# Patient Record
Sex: MALE | Race: WHITE | NOT HISPANIC OR LATINO | URBAN - METROPOLITAN AREA
[De-identification: names, ages, dates, MRNs, and addresses within clinical notes are randomized per-mention and may not be internally consistent; named-entity substitution may affect disease eponyms.]

---

## 2018-03-19 ENCOUNTER — OUTPATIENT (OUTPATIENT)
Dept: OUTPATIENT SERVICES | Facility: HOSPITAL | Age: 33
LOS: 1 days | Discharge: HOME | End: 2018-03-19

## 2018-03-19 DIAGNOSIS — I26.99 OTHER PULMONARY EMBOLISM WITHOUT ACUTE COR PULMONALE: ICD-10-CM

## 2018-03-19 DIAGNOSIS — D68.9 COAGULATION DEFECT, UNSPECIFIED: ICD-10-CM

## 2018-03-22 ENCOUNTER — LABORATORY RESULT (OUTPATIENT)
Age: 33
End: 2018-03-22

## 2018-03-22 ENCOUNTER — APPOINTMENT (OUTPATIENT)
Dept: HEMATOLOGY ONCOLOGY | Facility: CLINIC | Age: 33
End: 2018-03-22
Payer: MEDICAID

## 2018-03-22 ENCOUNTER — RESULT REVIEW (OUTPATIENT)
Age: 33
End: 2018-03-22

## 2018-03-22 ENCOUNTER — OUTPATIENT (OUTPATIENT)
Dept: OUTPATIENT SERVICES | Facility: HOSPITAL | Age: 33
LOS: 1 days | End: 2018-03-22
Payer: MEDICAID

## 2018-03-22 VITALS
TEMPERATURE: 98 F | OXYGEN SATURATION: 94 % | BODY MASS INDEX: 27.55 KG/M2 | HEIGHT: 71 IN | SYSTOLIC BLOOD PRESSURE: 130 MMHG | DIASTOLIC BLOOD PRESSURE: 79 MMHG | WEIGHT: 196.76 LBS | HEART RATE: 77 BPM

## 2018-03-22 DIAGNOSIS — F17.200 NICOTINE DEPENDENCE, UNSPECIFIED, UNCOMPLICATED: ICD-10-CM

## 2018-03-22 DIAGNOSIS — Z80.7 FAMILY HISTORY OF OTHER MALIGNANT NEOPLASMS OF LYMPHOID, HEMATOPOIETIC AND RELATED TISSUES: ICD-10-CM

## 2018-03-22 DIAGNOSIS — D68.9 COAGULATION DEFECT, UNSPECIFIED: ICD-10-CM

## 2018-03-22 DIAGNOSIS — Z78.9 OTHER SPECIFIED HEALTH STATUS: ICD-10-CM

## 2018-03-22 LAB
BASOPHILS # BLD AUTO: 0.2 K/UL — SIGNIFICANT CHANGE UP (ref 0–0.2)
BASOPHILS NFR BLD AUTO: 1.6 % — SIGNIFICANT CHANGE UP (ref 0–2)
EOSINOPHIL # BLD AUTO: 0.5 K/UL — SIGNIFICANT CHANGE UP (ref 0–0.5)
EOSINOPHIL NFR BLD AUTO: 5.6 % — SIGNIFICANT CHANGE UP (ref 0–6)
HCT VFR BLD CALC: 47.7 % — SIGNIFICANT CHANGE UP (ref 39–50)
HGB BLD-MCNC: 16.7 G/DL — SIGNIFICANT CHANGE UP (ref 13–17)
LYMPHOCYTES # BLD AUTO: 2.6 K/UL — SIGNIFICANT CHANGE UP (ref 1–3.3)
LYMPHOCYTES # BLD AUTO: 26.6 % — SIGNIFICANT CHANGE UP (ref 13–44)
MCHC RBC-ENTMCNC: 29.3 PG — SIGNIFICANT CHANGE UP (ref 27–34)
MCHC RBC-ENTMCNC: 34.9 GM/DL — SIGNIFICANT CHANGE UP (ref 32–36)
MCV RBC AUTO: 83.8 FL — SIGNIFICANT CHANGE UP (ref 80–100)
MONOCYTES # BLD AUTO: 0.6 K/UL — SIGNIFICANT CHANGE UP (ref 0–0.9)
MONOCYTES NFR BLD AUTO: 5.7 % — SIGNIFICANT CHANGE UP (ref 2–14)
NEUTROPHILS # BLD AUTO: 5.8 K/UL — SIGNIFICANT CHANGE UP (ref 1.8–7.4)
NEUTROPHILS NFR BLD AUTO: 60.4 % — SIGNIFICANT CHANGE UP (ref 43–77)
PLATELET # BLD AUTO: 254 K/UL — SIGNIFICANT CHANGE UP (ref 150–400)
RBC # BLD: 5.69 M/UL — SIGNIFICANT CHANGE UP (ref 4.2–5.8)
RBC # FLD: 11.7 % — SIGNIFICANT CHANGE UP (ref 10.3–14.5)
WBC # BLD: 9.7 K/UL — SIGNIFICANT CHANGE UP (ref 3.8–10.5)
WBC # FLD AUTO: 9.7 K/UL — SIGNIFICANT CHANGE UP (ref 3.8–10.5)

## 2018-03-22 PROCEDURE — 81241 F5 GENE: CPT

## 2018-03-22 PROCEDURE — 99205 OFFICE O/P NEW HI 60 MIN: CPT

## 2018-03-22 PROCEDURE — G0452: CPT | Mod: 26

## 2018-03-22 PROCEDURE — 81240 F2 GENE: CPT

## 2018-03-23 LAB
ALBUMIN SERPL ELPH-MCNC: 4.9 G/DL
ALP BLD-CCNC: 104 U/L
ALT SERPL-CCNC: 25 U/L
ANION GAP SERPL CALC-SCNC: 14 MMOL/L
AST SERPL-CCNC: 30 U/L
BILIRUB SERPL-MCNC: 0.3 MG/DL
BUN SERPL-MCNC: 17 MG/DL
CALCIUM SERPL-MCNC: 9.8 MG/DL
CHLORIDE SERPL-SCNC: 102 MMOL/L
CO2 SERPL-SCNC: 26 MMOL/L
CREAT SERPL-MCNC: 0.86 MG/DL
FERRITIN SERPL-MCNC: 208 NG/ML
IRON SATN MFR SERPL: 35 %
IRON SERPL-MCNC: 110 UG/DL
POTASSIUM SERPL-SCNC: 5 MMOL/L
PROT SERPL-MCNC: 8 G/DL
SODIUM SERPL-SCNC: 142 MMOL/L
TIBC SERPL-MCNC: 314 UG/DL
UIBC SERPL-MCNC: 204 UG/DL

## 2018-03-26 DIAGNOSIS — E83.110 HEREDITARY HEMOCHROMATOSIS: ICD-10-CM

## 2018-03-27 LAB
B2 GLYCOPROT1 AB SER QL: POSITIVE
CARDIOLIPIN AB SER IA-ACNC: NEGATIVE

## 2018-04-02 DIAGNOSIS — R59.0 LOCALIZED ENLARGED LYMPH NODES: ICD-10-CM

## 2018-04-02 LAB — TM INTERPRETATION: NORMAL

## 2018-07-09 ENCOUNTER — OUTPATIENT (OUTPATIENT)
Dept: OUTPATIENT SERVICES | Facility: HOSPITAL | Age: 33
LOS: 1 days | Discharge: HOME | End: 2018-07-09

## 2018-07-09 DIAGNOSIS — I26.99 OTHER PULMONARY EMBOLISM WITHOUT ACUTE COR PULMONALE: ICD-10-CM

## 2018-07-13 ENCOUNTER — APPOINTMENT (OUTPATIENT)
Dept: HEMATOLOGY ONCOLOGY | Facility: CLINIC | Age: 33
End: 2018-07-13

## 2018-08-03 ENCOUNTER — RX RENEWAL (OUTPATIENT)
Age: 33
End: 2018-08-03

## 2018-12-16 ENCOUNTER — OUTPATIENT (OUTPATIENT)
Dept: OUTPATIENT SERVICES | Facility: HOSPITAL | Age: 33
LOS: 1 days | Discharge: HOME | End: 2018-12-16

## 2018-12-16 DIAGNOSIS — I26.99 OTHER PULMONARY EMBOLISM WITHOUT ACUTE COR PULMONALE: ICD-10-CM

## 2018-12-16 DIAGNOSIS — E83.110 HEREDITARY HEMOCHROMATOSIS: ICD-10-CM

## 2018-12-28 ENCOUNTER — APPOINTMENT (OUTPATIENT)
Dept: HEMATOLOGY ONCOLOGY | Facility: CLINIC | Age: 33
End: 2018-12-28
Payer: COMMERCIAL

## 2018-12-28 VITALS
SYSTOLIC BLOOD PRESSURE: 164 MMHG | TEMPERATURE: 98.2 F | OXYGEN SATURATION: 97 % | WEIGHT: 188 LBS | HEIGHT: 71 IN | DIASTOLIC BLOOD PRESSURE: 76 MMHG | BODY MASS INDEX: 26.32 KG/M2 | HEART RATE: 79 BPM

## 2018-12-28 DIAGNOSIS — E83.110 HEREDITARY HEMOCHROMATOSIS: ICD-10-CM

## 2018-12-28 DIAGNOSIS — I26.99 OTHER PULMONARY EMBOLISM W/OUT ACUTE COR PULMONALE: ICD-10-CM

## 2018-12-28 PROCEDURE — 99214 OFFICE O/P EST MOD 30 MIN: CPT

## 2018-12-28 RX ORDER — RIVAROXABAN 20 MG/1
20 TABLET, FILM COATED ORAL
Qty: 30 | Refills: 3 | Status: DISCONTINUED | COMMUNITY
Start: 2018-03-22 | End: 2018-12-28

## 2018-12-28 NOTE — REASON FOR VISIT
[Parent] : parent [Follow-Up Visit] : a follow-up visit for [FreeTextEntry2] : pulmonary emboli, hemochromatosis

## 2018-12-28 NOTE — ASSESSMENT
[FreeTextEntry1] : 32 year old gentleman, current smoker, diagnosed with pulmonary emboli on 3/1/18.  This appears to be an unprovoked event. CT angio showed multiple pulmonary emboli on the right side and possible lingula infiltrate vs infarction.     He tested negative for lupus anticoagulant, protein S, protein C, antithrombin III.  He needs FVL and PT gene mutation, as well as B2 glycoprotein and anticardiolipin antibodies.  He completed ~9 months of anticoagulation in 12/2018. \par \par Given reported history of hemochromatosis, HFE testing was done which showed H63D homozygous mutation which is less likely to be associated with iron overload as compared to C282Y mutation (for which he was normal).  Serum ferriting was normal at 208 ng/ml, %sat 35.  Work up not suggestive of iron overload, no role for MRI Liver  at this time. \par \par Follow up PRN, follow up with PCP for routine physicals.

## 2018-12-28 NOTE — HISTORY OF PRESENT ILLNESS
[de-identified] : Mr. Maxwell is a 32 year old gentleman who presents for evaluation of pulmonary emboli and hemochromatosis. \par \par He was admitted to [facility] on 31/18 with left sided chest pain and hemoptysis.   He was noted to have bilateral pulmonary emboli and pneumonia. He was started on Heparin infusion and also received antibiotics.  He was transitioned to apixaban but broke out in rash on the 3rd day and was switched to Xarelto.  He's on Xarelto 20 mg daily.  \par \par He denies any recent surgery, travel, no immobility.  He was smoking 1/2 PPD x since age 18.  He is now trying to quit, currently doing 3 cigarettes per day.  He reports right leg pain x 6 months on and off. No fevers, night sweats. He has lost 7 lbs since hospitalization.  No history of stroke. His grandmother was diagnosed with a blood clot at age 95. \par No fevers.  No SOB, chest pain, BERNARD.  No dizziness or headaches. No hematuria. No hematochezia.  No melena. \par \par 3/2/18 hypercoag work up\par Lupus anticoagulant - negative\par Antithrombin III - 111%\par Protein C activity - 81%\par Protein S activity - 76%\par \par 3/1/18 CT chest - multiple pulmonary emboli of right lung.  Lingula consolidation may represent atelectasis vs pneumonia or evolving pulmonary infarction.  Multiple periaortic lymph nodes are noted, largest of which measures appximately 1.2 x 0.9 cm and 1 x 1 cm, both of which lack typical fatty hilum.   6 mm sclerotic focus, likely bone island at anterior end plate of T12 vertebral body measuring 6 x 5 mm. \par \par 3/3/18 CT abd/pelvis : mild hepatomegaly, no focal lesion. No enlarged retroperitoneal pelvic adenopathy.  Copious stool in colon.  \par \par He was diagnosed with hemochromatosis at age 18.  He had HFE gene testing done at that time but doesn't have the results.  He also had a liver biopsy which reportedly showed iron deposition. He has never had a phlebotomy and has not seen a hematologist before. \par  [de-identified] : Mr. Maxwell returns today for his follow-up. He stopped Xarelto 3 weeks ago.\par He admits that he is feeling good and has no problems.\par He denies fevers, night sweats, weight loss, recent infections, and taking any medications.\par He notes that he is trying to stop smoking.\par The remainder of his ROS is negative.

## 2018-12-28 NOTE — ADDENDUM
[FreeTextEntry1] : I, Sathya Pineda, acted solely as a scribe for Dr. Saleem Mercer on this date 12/28/18.

## 2020-08-09 ENCOUNTER — INPATIENT (INPATIENT)
Facility: HOSPITAL | Age: 35
LOS: 1 days | Discharge: HOME | DRG: 897 | End: 2020-08-11
Attending: HOSPITALIST | Admitting: HOSPITALIST
Payer: COMMERCIAL

## 2020-08-09 VITALS
WEIGHT: 188.05 LBS | SYSTOLIC BLOOD PRESSURE: 127 MMHG | OXYGEN SATURATION: 99 % | DIASTOLIC BLOOD PRESSURE: 74 MMHG | HEART RATE: 71 BPM | HEIGHT: 71 IN | TEMPERATURE: 98 F | RESPIRATION RATE: 20 BRPM

## 2020-08-09 DIAGNOSIS — F11.23 OPIOID DEPENDENCE WITH WITHDRAWAL: ICD-10-CM

## 2020-08-09 LAB
BASOPHILS # BLD AUTO: 0.02 K/UL — SIGNIFICANT CHANGE UP (ref 0–0.2)
BASOPHILS NFR BLD AUTO: 0.2 % — SIGNIFICANT CHANGE UP (ref 0–2)
EOSINOPHIL # BLD AUTO: 0.02 K/UL — SIGNIFICANT CHANGE UP (ref 0–0.5)
EOSINOPHIL NFR BLD AUTO: 0.2 % — SIGNIFICANT CHANGE UP (ref 0–6)
HCT VFR BLD CALC: 41.2 % — SIGNIFICANT CHANGE UP (ref 39–50)
HGB BLD-MCNC: 14.4 G/DL — SIGNIFICANT CHANGE UP (ref 13–17)
IMM GRANULOCYTES NFR BLD AUTO: 0.3 % — SIGNIFICANT CHANGE UP (ref 0–1.5)
LYMPHOCYTES # BLD AUTO: 1.45 K/UL — SIGNIFICANT CHANGE UP (ref 1–3.3)
LYMPHOCYTES # BLD AUTO: 14.9 % — SIGNIFICANT CHANGE UP (ref 13–44)
MCHC RBC-ENTMCNC: 28.9 PG — SIGNIFICANT CHANGE UP (ref 27–34)
MCHC RBC-ENTMCNC: 35 GM/DL — SIGNIFICANT CHANGE UP (ref 32–36)
MCV RBC AUTO: 82.6 FL — SIGNIFICANT CHANGE UP (ref 80–100)
MONOCYTES # BLD AUTO: 0.35 K/UL — SIGNIFICANT CHANGE UP (ref 0–0.9)
MONOCYTES NFR BLD AUTO: 3.6 % — SIGNIFICANT CHANGE UP (ref 2–14)
NEUTROPHILS # BLD AUTO: 7.89 K/UL — HIGH (ref 1.8–7.4)
NEUTROPHILS NFR BLD AUTO: 80.8 % — HIGH (ref 43–77)
NRBC # BLD: 0 /100 WBCS — SIGNIFICANT CHANGE UP (ref 0–0)
PLATELET # BLD AUTO: 205 K/UL — SIGNIFICANT CHANGE UP (ref 150–400)
RBC # BLD: 4.99 M/UL — SIGNIFICANT CHANGE UP (ref 4.2–5.8)
RBC # FLD: 12.7 % — SIGNIFICANT CHANGE UP (ref 10.3–14.5)
WBC # BLD: 9.76 K/UL — SIGNIFICANT CHANGE UP (ref 3.8–10.5)
WBC # FLD AUTO: 9.76 K/UL — SIGNIFICANT CHANGE UP (ref 3.8–10.5)

## 2020-08-09 PROCEDURE — 99285 EMERGENCY DEPT VISIT HI MDM: CPT

## 2020-08-09 PROCEDURE — 93010 ELECTROCARDIOGRAM REPORT: CPT

## 2020-08-09 RX ORDER — BUPRENORPHINE AND NALOXONE 2; .5 MG/1; MG/1
1 TABLET SUBLINGUAL ONCE
Refills: 0 | Status: DISCONTINUED | OUTPATIENT
Start: 2020-08-09 | End: 2020-08-10

## 2020-08-09 RX ORDER — ONDANSETRON 8 MG/1
4 TABLET, FILM COATED ORAL ONCE
Refills: 0 | Status: COMPLETED | OUTPATIENT
Start: 2020-08-09 | End: 2020-08-09

## 2020-08-09 RX ORDER — SODIUM CHLORIDE 9 MG/ML
1000 INJECTION INTRAMUSCULAR; INTRAVENOUS; SUBCUTANEOUS ONCE
Refills: 0 | Status: COMPLETED | OUTPATIENT
Start: 2020-08-09 | End: 2020-08-09

## 2020-08-09 RX ADMIN — ONDANSETRON 4 MILLIGRAM(S): 8 TABLET, FILM COATED ORAL at 22:29

## 2020-08-09 RX ADMIN — Medication 0.1 MILLIGRAM(S): at 22:29

## 2020-08-09 RX ADMIN — SODIUM CHLORIDE 1000 MILLILITER(S): 9 INJECTION INTRAMUSCULAR; INTRAVENOUS; SUBCUTANEOUS at 22:30

## 2020-08-09 NOTE — ED ADULT TRIAGE NOTE - CHIEF COMPLAINT QUOTE
hx heroin abuse; relapsed; took heroin 2 days ago; come in with c/o withdrawal; c/o overall body aches; vomiting bile; feels in a fog; denies SI/HI or hallucinations

## 2020-08-09 NOTE — ED ADULT NURSE NOTE - OBJECTIVE STATEMENT
Pt is a 36 y/o male c/o hx heroin abuse, last used 2 days ago and would like to "detox"/ get rehab. States he wants to stop, still has some at home and does not feel safe going home, lives alone, denies any other drug or alcohol use. Has had symptoms x1.5 days of general body aches, headache, photosensitivity, nausea. Denies CP, SOB, V/D, numbness, tingling, cough, fever, chills, dizziness, weakness, headache. A&Ox3. Breathing unlabored and spontaneous. Abdomen soft, nontender, nondistended. Full ROM and strength of extremities. Safety and comfort measures provided.

## 2020-08-09 NOTE — ED PROVIDER NOTE - CLINICAL SUMMARY MEDICAL DECISION MAKING FREE TEXT BOX
36y/o M w/ h/o PE (no AC), opioid abuse p/w opioid withdrawal symptoms. Slightly hypertensive. Well appearing. Will give IVF, zofran, clonidine. Social work for opioid abuse. 36y/o M w/ h/o PE (no AC), opioid abuse p/w opioid withdrawal symptoms. Slightly hypertensive. Well appearing. COWS score 18. Will give IVF, zofran, clonidine. Social work for opioid abuse.

## 2020-08-09 NOTE — ED PROVIDER NOTE - OBJECTIVE STATEMENT
34y/o M w/ h/o PE (no AC), opioid abuse p/w opioid withdrawal. Pt states that he used to 36y/o M w/ h/o PE (no AC), opioid abuse p/w opioid withdrawal. Pt states that he used to take oxy for back pain and snorted heroine 3 wks ago, then again 2 d ago. Now c/o sweating, 3 episdoes of bilious emesis, lower extremity bone pain, runny nose, excessive yawning. Interested in detox. Denies fevers, chest pain, cough, sob, abdominal pain, back pain, dysuria, numbness, tingling, alcohol abuse, IVDA, other recreational drugs, SI/HI. 34y/o M w/ h/o PE (no AC), opioid abuse p/w opioid withdrawal. Pt states that he used to take oxy for back pain and snorted heroine 3 wks ago, then again 2 d ago. Now c/o sweating, 3 episodes of bilious emesis, lower extremity bone pain, runny nose, excessive yawning. Interested in detox. Denies fevers, chest pain, cough, sob, abdominal pain, back pain, dysuria, numbness, tingling, alcohol abuse, IVDA, other recreational drugs, SI/HI.

## 2020-08-09 NOTE — ED PROVIDER NOTE - ATTENDING CONTRIBUTION TO CARE
34 yo male hx of opiate abuse including snorting heroin x 2 p/w myalgias, fogginess, nausea, irritability, concerning for withdrawal.  nontoxic otherwise on exam.  will give IVF, medicate, check labs and reassess.

## 2020-08-09 NOTE — ED PROVIDER NOTE - PHYSICAL EXAMINATION
GENERAL: restless non-toxic appearing in NAD  HEAD: normocephalic, atraumatic  HEENT: normal conjunctiva, oral mucosa moist, uvula midline, no tonsilar exudates, neck supple, no JVD  CARDIAC: regular rate and rhythm, normal S1S2, no appreciable murmurs, no peripheral edema, 2+ pulses in UE/LE b/l  PULM: normal breath sounds, CTA b/l, no rales, rhonchi, wheezing  GI: abdomen nondistended, soft, nontender, no guarding, no rebound tenderness  : no CVA tenderness b/l, no suprapubic tenderness  NEURO: pupils 5 mm, PERRL, EOMI, CN2-12 intact, 5/5 motor strength BUE and BLE, sensation intact to light touch BUE and BLE: C5-T1 and L3-S1   MSK: no calf tenderness b/l  SKIN: well-perfused, extremities warm, no visible rashes  PSYCH: appropriate mood and affect GENERAL: restless non-toxic appearing in NAD  HEAD: normocephalic, atraumatic  HEENT: normal conjunctiva, oral mucosa moist  CARDIAC: regular rate and rhythm, normal S1S2, no appreciable murmurs  PULM: normal breath sounds, CTA b/l, no rales, rhonchi, wheezing  GI: abdomen nondistended, soft, nontender, no guarding, no rebound tenderness  : no CVA tenderness b/l, no suprapubic tenderness  NEURO: pupils 5 mm, PERRL, EOMI, CN2-12 intact, 5/5 motor strength BUE and BLE, sensation intact to light touch BUE and BLE  MSK: no calf tenderness b/l  SKIN: well-perfused, extremities warm, no visible rashes  PSYCH: appropriate mood and affect

## 2020-08-09 NOTE — ED PROVIDER NOTE - NS ED ROS FT
GENERAL: +chills, sweating; denies fever  HEENT: +runny nose; denies headaches, dizziness, ear pain, vision changes  CARDIAC: denies chest pain, palpitations  PULM: denies SOB, cough  GI: +nausea, vomiting, diarrhea; denies abdominal pain, constipation, hematochezia  : denies dysuria, frequency, incontinence, hematuria  NEURO: denies motor weakness, sensory changes  MSK: +joint pain  SKIN: denies new rashes, hives  HEME: denies active bleeding, bruising

## 2020-08-09 NOTE — ED PROVIDER NOTE - PROGRESS NOTE DETAILS
Jose R, PGY2: pt states he has heroine at home and severe withdrawal. pt lives by himself, not safe discharge home. pt's parents given narcan kit to go home

## 2020-08-10 DIAGNOSIS — I26.99 OTHER PULMONARY EMBOLISM WITHOUT ACUTE COR PULMONALE: ICD-10-CM

## 2020-08-10 DIAGNOSIS — Z02.9 ENCOUNTER FOR ADMINISTRATIVE EXAMINATIONS, UNSPECIFIED: ICD-10-CM

## 2020-08-10 DIAGNOSIS — F11.23 OPIOID DEPENDENCE WITH WITHDRAWAL: ICD-10-CM

## 2020-08-10 DIAGNOSIS — F11.20 OPIOID DEPENDENCE, UNCOMPLICATED: ICD-10-CM

## 2020-08-10 DIAGNOSIS — Z29.9 ENCOUNTER FOR PROPHYLACTIC MEASURES, UNSPECIFIED: ICD-10-CM

## 2020-08-10 DIAGNOSIS — F17.200 NICOTINE DEPENDENCE, UNSPECIFIED, UNCOMPLICATED: ICD-10-CM

## 2020-08-10 LAB
ALBUMIN SERPL ELPH-MCNC: 4.5 G/DL — SIGNIFICANT CHANGE UP (ref 3.3–5)
ALBUMIN SERPL ELPH-MCNC: 4.7 G/DL — SIGNIFICANT CHANGE UP (ref 3.3–5)
ALP SERPL-CCNC: 66 U/L — SIGNIFICANT CHANGE UP (ref 40–120)
ALP SERPL-CCNC: 66 U/L — SIGNIFICANT CHANGE UP (ref 40–120)
ALT FLD-CCNC: 16 U/L — SIGNIFICANT CHANGE UP (ref 10–45)
ALT FLD-CCNC: 16 U/L — SIGNIFICANT CHANGE UP (ref 10–45)
ANION GAP SERPL CALC-SCNC: 12 MMOL/L — SIGNIFICANT CHANGE UP (ref 5–17)
ANION GAP SERPL CALC-SCNC: 15 MMOL/L — SIGNIFICANT CHANGE UP (ref 5–17)
AST SERPL-CCNC: 20 U/L — SIGNIFICANT CHANGE UP (ref 10–40)
AST SERPL-CCNC: 22 U/L — SIGNIFICANT CHANGE UP (ref 10–40)
BASOPHILS # BLD AUTO: 0.02 K/UL — SIGNIFICANT CHANGE UP (ref 0–0.2)
BASOPHILS NFR BLD AUTO: 0.2 % — SIGNIFICANT CHANGE UP (ref 0–2)
BILIRUB SERPL-MCNC: 0.6 MG/DL — SIGNIFICANT CHANGE UP (ref 0.2–1.2)
BILIRUB SERPL-MCNC: 0.7 MG/DL — SIGNIFICANT CHANGE UP (ref 0.2–1.2)
BUN SERPL-MCNC: 10 MG/DL — SIGNIFICANT CHANGE UP (ref 7–23)
BUN SERPL-MCNC: 11 MG/DL — SIGNIFICANT CHANGE UP (ref 7–23)
CALCIUM SERPL-MCNC: 9.1 MG/DL — SIGNIFICANT CHANGE UP (ref 8.4–10.5)
CALCIUM SERPL-MCNC: 9.7 MG/DL — SIGNIFICANT CHANGE UP (ref 8.4–10.5)
CHLORIDE SERPL-SCNC: 103 MMOL/L — SIGNIFICANT CHANGE UP (ref 96–108)
CHLORIDE SERPL-SCNC: 105 MMOL/L — SIGNIFICANT CHANGE UP (ref 96–108)
CO2 SERPL-SCNC: 23 MMOL/L — SIGNIFICANT CHANGE UP (ref 22–31)
CO2 SERPL-SCNC: 27 MMOL/L — SIGNIFICANT CHANGE UP (ref 22–31)
CREAT SERPL-MCNC: 0.81 MG/DL — SIGNIFICANT CHANGE UP (ref 0.5–1.3)
CREAT SERPL-MCNC: 0.86 MG/DL — SIGNIFICANT CHANGE UP (ref 0.5–1.3)
EOSINOPHIL # BLD AUTO: 0.01 K/UL — SIGNIFICANT CHANGE UP (ref 0–0.5)
EOSINOPHIL NFR BLD AUTO: 0.1 % — SIGNIFICANT CHANGE UP (ref 0–6)
GLUCOSE SERPL-MCNC: 106 MG/DL — HIGH (ref 70–99)
GLUCOSE SERPL-MCNC: 112 MG/DL — HIGH (ref 70–99)
HCT VFR BLD CALC: 41.7 % — SIGNIFICANT CHANGE UP (ref 39–50)
HGB BLD-MCNC: 14.6 G/DL — SIGNIFICANT CHANGE UP (ref 13–17)
IMM GRANULOCYTES NFR BLD AUTO: 0.4 % — SIGNIFICANT CHANGE UP (ref 0–1.5)
LYMPHOCYTES # BLD AUTO: 1.19 K/UL — SIGNIFICANT CHANGE UP (ref 1–3.3)
LYMPHOCYTES # BLD AUTO: 12 % — LOW (ref 13–44)
MCHC RBC-ENTMCNC: 28.8 PG — SIGNIFICANT CHANGE UP (ref 27–34)
MCHC RBC-ENTMCNC: 35 GM/DL — SIGNIFICANT CHANGE UP (ref 32–36)
MCV RBC AUTO: 82.2 FL — SIGNIFICANT CHANGE UP (ref 80–100)
MONOCYTES # BLD AUTO: 0.35 K/UL — SIGNIFICANT CHANGE UP (ref 0–0.9)
MONOCYTES NFR BLD AUTO: 3.5 % — SIGNIFICANT CHANGE UP (ref 2–14)
NEUTROPHILS # BLD AUTO: 8.31 K/UL — HIGH (ref 1.8–7.4)
NEUTROPHILS NFR BLD AUTO: 83.8 % — HIGH (ref 43–77)
NRBC # BLD: 0 /100 WBCS — SIGNIFICANT CHANGE UP (ref 0–0)
PLATELET # BLD AUTO: 221 K/UL — SIGNIFICANT CHANGE UP (ref 150–400)
POTASSIUM SERPL-MCNC: 3.6 MMOL/L — SIGNIFICANT CHANGE UP (ref 3.5–5.3)
POTASSIUM SERPL-MCNC: 3.9 MMOL/L — SIGNIFICANT CHANGE UP (ref 3.5–5.3)
POTASSIUM SERPL-SCNC: 3.6 MMOL/L — SIGNIFICANT CHANGE UP (ref 3.5–5.3)
POTASSIUM SERPL-SCNC: 3.9 MMOL/L — SIGNIFICANT CHANGE UP (ref 3.5–5.3)
PROT SERPL-MCNC: 6.8 G/DL — SIGNIFICANT CHANGE UP (ref 6–8.3)
PROT SERPL-MCNC: 7 G/DL — SIGNIFICANT CHANGE UP (ref 6–8.3)
RBC # BLD: 5.07 M/UL — SIGNIFICANT CHANGE UP (ref 4.2–5.8)
RBC # FLD: 12.4 % — SIGNIFICANT CHANGE UP (ref 10.3–14.5)
SARS-COV-2 IGG SERPL QL IA: NEGATIVE — SIGNIFICANT CHANGE UP
SARS-COV-2 IGM SERPL IA-ACNC: 0.08 INDEX — SIGNIFICANT CHANGE UP
SARS-COV-2 RNA SPEC QL NAA+PROBE: SIGNIFICANT CHANGE UP
SODIUM SERPL-SCNC: 141 MMOL/L — SIGNIFICANT CHANGE UP (ref 135–145)
SODIUM SERPL-SCNC: 144 MMOL/L — SIGNIFICANT CHANGE UP (ref 135–145)
WBC # BLD: 9.92 K/UL — SIGNIFICANT CHANGE UP (ref 3.8–10.5)
WBC # FLD AUTO: 9.92 K/UL — SIGNIFICANT CHANGE UP (ref 3.8–10.5)

## 2020-08-10 PROCEDURE — 99233 SBSQ HOSP IP/OBS HIGH 50: CPT | Mod: GC

## 2020-08-10 PROCEDURE — 99406 BEHAV CHNG SMOKING 3-10 MIN: CPT

## 2020-08-10 PROCEDURE — 99223 1ST HOSP IP/OBS HIGH 75: CPT | Mod: 25

## 2020-08-10 PROCEDURE — 93010 ELECTROCARDIOGRAM REPORT: CPT

## 2020-08-10 PROCEDURE — 90792 PSYCH DIAG EVAL W/MED SRVCS: CPT

## 2020-08-10 RX ORDER — BUPRENORPHINE AND NALOXONE 2; .5 MG/1; MG/1
1 TABLET SUBLINGUAL ONCE
Refills: 0 | Status: DISCONTINUED | OUTPATIENT
Start: 2020-08-10 | End: 2020-08-10

## 2020-08-10 RX ORDER — CYCLOBENZAPRINE HYDROCHLORIDE 10 MG/1
10 TABLET, FILM COATED ORAL THREE TIMES A DAY
Refills: 0 | Status: DISCONTINUED | OUTPATIENT
Start: 2020-08-10 | End: 2020-08-11

## 2020-08-10 RX ORDER — NICOTINE POLACRILEX 2 MG
1 GUM BUCCAL DAILY
Refills: 0 | Status: DISCONTINUED | OUTPATIENT
Start: 2020-08-10 | End: 2020-08-11

## 2020-08-10 RX ORDER — BUPRENORPHINE AND NALOXONE 2; .5 MG/1; MG/1
1 TABLET SUBLINGUAL
Refills: 0 | Status: DISCONTINUED | OUTPATIENT
Start: 2020-08-10 | End: 2020-08-10

## 2020-08-10 RX ORDER — SODIUM CHLORIDE 9 MG/ML
1000 INJECTION INTRAMUSCULAR; INTRAVENOUS; SUBCUTANEOUS
Refills: 0 | Status: DISCONTINUED | OUTPATIENT
Start: 2020-08-10 | End: 2020-08-11

## 2020-08-10 RX ORDER — IBUPROFEN 200 MG
600 TABLET ORAL EVERY 6 HOURS
Refills: 0 | Status: DISCONTINUED | OUTPATIENT
Start: 2020-08-10 | End: 2020-08-11

## 2020-08-10 RX ORDER — BUPRENORPHINE AND NALOXONE 2; .5 MG/1; MG/1
1 TABLET SUBLINGUAL
Refills: 0 | Status: COMPLETED | OUTPATIENT
Start: 2020-08-11 | End: 2020-08-11

## 2020-08-10 RX ORDER — LOPERAMIDE HCL 2 MG
2 TABLET ORAL EVERY 12 HOURS
Refills: 0 | Status: DISCONTINUED | OUTPATIENT
Start: 2020-08-10 | End: 2020-08-11

## 2020-08-10 RX ORDER — ONDANSETRON 8 MG/1
4 TABLET, FILM COATED ORAL EVERY 8 HOURS
Refills: 0 | Status: DISCONTINUED | OUTPATIENT
Start: 2020-08-10 | End: 2020-08-11

## 2020-08-10 RX ORDER — LOPERAMIDE HCL 2 MG
2 TABLET ORAL
Refills: 0 | Status: DISCONTINUED | OUTPATIENT
Start: 2020-08-10 | End: 2020-08-10

## 2020-08-10 RX ORDER — ACETAMINOPHEN 500 MG
650 TABLET ORAL EVERY 4 HOURS
Refills: 0 | Status: DISCONTINUED | OUTPATIENT
Start: 2020-08-10 | End: 2020-08-11

## 2020-08-10 RX ORDER — BUPRENORPHINE AND NALOXONE 2; .5 MG/1; MG/1
2 TABLET SUBLINGUAL ONCE
Refills: 0 | Status: DISCONTINUED | OUTPATIENT
Start: 2020-08-10 | End: 2020-08-10

## 2020-08-10 RX ORDER — ENOXAPARIN SODIUM 100 MG/ML
40 INJECTION SUBCUTANEOUS DAILY
Refills: 0 | Status: DISCONTINUED | OUTPATIENT
Start: 2020-08-10 | End: 2020-08-11

## 2020-08-10 RX ORDER — BUPRENORPHINE AND NALOXONE 2; .5 MG/1; MG/1
1 TABLET SUBLINGUAL
Refills: 0 | Status: DISCONTINUED | OUTPATIENT
Start: 2020-08-10 | End: 2020-08-11

## 2020-08-10 RX ORDER — ONDANSETRON 8 MG/1
4 TABLET, FILM COATED ORAL EVERY 6 HOURS
Refills: 0 | Status: DISCONTINUED | OUTPATIENT
Start: 2020-08-10 | End: 2020-08-10

## 2020-08-10 RX ADMIN — ENOXAPARIN SODIUM 40 MILLIGRAM(S): 100 INJECTION SUBCUTANEOUS at 12:13

## 2020-08-10 RX ADMIN — BUPRENORPHINE AND NALOXONE 1 TABLET(S): 2; .5 TABLET SUBLINGUAL at 12:23

## 2020-08-10 RX ADMIN — Medication 0.1 MILLIGRAM(S): at 12:15

## 2020-08-10 RX ADMIN — SODIUM CHLORIDE 75 MILLILITER(S): 9 INJECTION INTRAMUSCULAR; INTRAVENOUS; SUBCUTANEOUS at 03:50

## 2020-08-10 RX ADMIN — Medication 650 MILLIGRAM(S): at 07:50

## 2020-08-10 RX ADMIN — BUPRENORPHINE AND NALOXONE 2 TABLET(S): 2; .5 TABLET SUBLINGUAL at 03:14

## 2020-08-10 RX ADMIN — Medication 650 MILLIGRAM(S): at 03:30

## 2020-08-10 RX ADMIN — Medication 650 MILLIGRAM(S): at 02:55

## 2020-08-10 RX ADMIN — BUPRENORPHINE AND NALOXONE 1 TABLET(S): 2; .5 TABLET SUBLINGUAL at 08:26

## 2020-08-10 RX ADMIN — Medication 0.1 MILLIGRAM(S): at 08:26

## 2020-08-10 RX ADMIN — Medication 0.1 MILLIGRAM(S): at 03:20

## 2020-08-10 RX ADMIN — Medication 0.1 MILLIGRAM(S): at 16:54

## 2020-08-10 RX ADMIN — ONDANSETRON 4 MILLIGRAM(S): 8 TABLET, FILM COATED ORAL at 07:45

## 2020-08-10 RX ADMIN — BUPRENORPHINE AND NALOXONE 1 TABLET(S): 2; .5 TABLET SUBLINGUAL at 00:56

## 2020-08-10 RX ADMIN — ONDANSETRON 4 MILLIGRAM(S): 8 TABLET, FILM COATED ORAL at 15:34

## 2020-08-10 RX ADMIN — Medication 1 PATCH: at 21:22

## 2020-08-10 RX ADMIN — CYCLOBENZAPRINE HYDROCHLORIDE 10 MILLIGRAM(S): 10 TABLET, FILM COATED ORAL at 14:40

## 2020-08-10 RX ADMIN — Medication 650 MILLIGRAM(S): at 07:45

## 2020-08-10 RX ADMIN — BUPRENORPHINE AND NALOXONE 1 TABLET(S): 2; .5 TABLET SUBLINGUAL at 21:22

## 2020-08-10 NOTE — PHARMACOTHERAPY INTERVENTION NOTE - COMMENTS
Spoke with patient to discuss outpatient medications. Patient does not currently take any prescription or OTC medications. He has a drug allergy to Eliquis in which he developed a full body rash.    Elvin Kelsey, PharmD Candidate 2021  Magalis AvilaD, BCPS  [phone]

## 2020-08-10 NOTE — H&P ADULT - NSHPPHYSICALEXAM_GEN_ALL_CORE
Vital Signs Last 24 Hrs  T(C): 36.8 (09 Aug 2020 23:00), Max: 36.9 (09 Aug 2020 21:40)  T(F): 98.2 (09 Aug 2020 23:00), Max: 98.4 (09 Aug 2020 21:40)  HR: 67 (10 Aug 2020 01:00) (67 - 82)  BP: 127/87 (10 Aug 2020 01:00) (127/74 - 143/78)  BP(mean): --  RR: 16 (10 Aug 2020 01:00) (16 - 20)  SpO2: 100% (10 Aug 2020 01:00) (99% - 100%)    GENERAL:  mild distress, well-developed, breathing regular non labored  HEAD:  Atraumatic, Normocephalic  ENT: EOMI, PERRLA, conjunctiva and sclera clear,  moist mucosa no pharyngeal erythema or exudates   NECK: supple , no JVD   CHEST/LUNG: Clear to auscultation bilaterally; No wheeze, equal breath sounds bilaterally   BACK: No spinal tenderness,  No CVA tenderness   HEART: Regular rate and rhythm; No murmurs, rubs, or gallops  ABDOMEN: Soft, Nontender, Nondistended; Bowel sounds present  EXTREMITIES:  No clubbing, cyanosis, or edema  MSK: No joint swelling or effusions, ROM intact   PSYCH: Normal behavior/affect  NEUROLOGY: AAOx3, non-focal, cranial nerves intact  SKIN: Normal color, No rashes or lesions

## 2020-08-10 NOTE — H&P ADULT - PROBLEM SELECTOR PLAN 5
Transitions of Care Status:  1.  Name of PCP: Rolan Rodriguez   2.  PCP Contacted on Admission: [ ] Y    [x ] N    3.  PCP contacted at Discharge: [ ] Y    [ ] N    [ ] N/A  4.  Post-Discharge Appointment Date and Location:  5.  Summary of Handoff given to PCP:

## 2020-08-10 NOTE — BEHAVIORAL HEALTH ASSESSMENT NOTE - HPI (INCLUDE ILLNESS QUALITY, SEVERITY, DURATION, TIMING, CONTEXT, MODIFYING FACTORS, ASSOCIATED SIGNS AND SYMPTOMS)
HPI:  Patient is a 35 year old male with a past medical history of PE not on AC , opioid abuse ,who presents for worsening withdrawal symptoms. Patient reports taking oxycodone in the past for back pain and during the past few weeks,  snorting  heroine. Over the past day he reports sweating, and several episodes of bilious vomiting,  shaking of his legs , lower extremity bone pain, runny nose, excessive yawning, no diarrhea. He is interested in detox.  He reports no fevers, chest pain, cough, sob, abdominal pain, back pain, dysuria, numbness, tingling, no history of  alcohol  use or IVDA, no SI/HI.    Psychiatry was consulted for medication management in context of heroin w/d.  Pt was started on Suboxone 2mg/0.5mg in the ER, has received 3 doses so far, with minimal relief of withdrawal symptoms.    Pt seen and evaluated, present calm, cooperative, in mild discomfort, endorsing bone pain. Pt endorses long history of opioid dependence, endorses using around 15 bags per day via insufflation and requiring more heroin, about every 30 minutes to avoid withdrawal symptom onset.     Pt otherwise denies any sx suggestive of depression, anxiety, pierre, psychosis, AVH/SI/HI intent or plan.

## 2020-08-10 NOTE — BEHAVIORAL HEALTH ASSESSMENT NOTE - SUMMARY
36 yo male with h/o PE not on AC, opioid use dependence, no prior psychiatric history, no suicide attempts, no detox/rehab admission  admitted for management of withdrawal. Pt presents in acute withdrawal not adequately managed on Suboxone 2/0.5.     Impression   opioid use disorder, severe, currently withdrawal     Plan   can give Suboxone 8mg/2mg TID starting tomorrow, because pt has already gotted total of 6mg today, can give 1 8/2 tab now, then in 12hours can give another dose, then start TID dosing starting 8/11    social work/ case management for follow up appointment care - pt will require a prescriber appt to ensure continuity and medication adherence otherwise he risks relapse     For breakthrough symptoms please refer to the following protocol, although not FDA approved, may alleviate symptoms associated with withdrawal symptoms:     Zofran 4mg q8h PRN vomiting/nausea max 12mg in 24hrs   Imodium 2mg q12h PRN diarrhea, until improvement of symptoms    clonidine 0.1mg -0.2mg q12h PRN  HOLD for SBP <100/60   Flexeril 10mg q12h PRN muscle spasms   Ibuprofen 400mg q6h PRN pain alternatively 800mg q8h PRN, with food if able to tolerate 36 yo male with h/o PE not on AC, opioid use dependence, no prior psychiatric history, no suicide attempts, no detox/rehab admission  admitted for management of withdrawal. Pt presents in acute withdrawal not adequately managed on Suboxone 2/0.5.     Impression   opioid use disorder, severe, currently withdrawal     Plan   can give Suboxone 8mg/2mg TID starting tomorrow, because pt has already received a total of 6mg today, can give 1 8/2 tab now, then in 12hours can give another dose, then start TID dosing starting 8/11    social work/ case management for follow up appointment care - pt will require a prescriber appt to ensure continuity and medication adherence otherwise he risks relapse     For breakthrough symptoms please refer to the following protocol, although not FDA approved, may alleviate symptoms associated with withdrawal symptoms:     Zofran 4mg q8h PRN vomiting/nausea max 12mg in 24hrs   Imodium 2mg q12h PRN diarrhea, until improvement of symptoms    clonidine 0.1mg -0.2mg q12h PRN  HOLD for SBP <100/60   Flexeril 10mg q12h PRN muscle spasms   Ibuprofen 400mg q6h PRN pain alternatively 800mg q8h PRN, with food if able to tolerate

## 2020-08-10 NOTE — BEHAVIORAL HEALTH ASSESSMENT NOTE - NSBHCHARTREVIEWLAB_PSY_A_CORE FT
CBC Full  -  ( 10 Aug 2020 07:23 )  WBC Count : 9.92 K/uL  RBC Count : 5.07 M/uL  Hemoglobin : 14.6 g/dL  Hematocrit : 41.7 %  Platelet Count - Automated : 221 K/uL  Mean Cell Volume : 82.2 fl  Mean Cell Hemoglobin : 28.8 pg  Mean Cell Hemoglobin Concentration : 35.0 gm/dL  Auto Neutrophil # : 8.31 K/uL  Auto Lymphocyte # : 1.19 K/uL  Auto Monocyte # : 0.35 K/uL  Auto Eosinophil # : 0.01 K/uL  Auto Basophil # : 0.02 K/uL  Auto Neutrophil % : 83.8 %  Auto Lymphocyte % : 12.0 %  Auto Monocyte % : 3.5 %  Auto Eosinophil % : 0.1 %  Auto Basophil % : 0.2 %    08-10    141  |  103  |  10  ----------------------------<  112<H>  3.6   |  23  |  0.81    Ca    9.7      10 Aug 2020 07:23    TPro  6.8  /  Alb  4.5  /  TBili  0.7  /  DBili  x   /  AST  22  /  ALT  16  /  AlkPhos  66  08-10    LIVER FUNCTIONS - ( 10 Aug 2020 07:23 )  Alb: 4.5 g/dL / Pro: 6.8 g/dL / ALK PHOS: 66 U/L / ALT: 16 U/L / AST: 22 U/L / GGT: x

## 2020-08-10 NOTE — PROGRESS NOTE ADULT - SUBJECTIVE AND OBJECTIVE BOX
***************************************************************  Nadia Pisano MD, PGY1 Resident  Internal Medicine   pager: [phone]  ***************************************************************    GRACIELA RAGLAND    Patient is a 35y old  Male who presents with a chief complaint of opioid withdrawal (10 Aug 2020 01:31)      Subjective:     Patient was actively withdrawing from opiate use.  Last use for heroin is noted to be 8/8 night. Could not quantify amount of heroin used.  This AM, having nausea and vomiting. Vomitus did not have blood. Noted sweat.   Experiencing sore pain from hip down in both legs.  Patient noted to have diarrhea every 30 minutes.    Denied SOB, chest pain, dizziness, or headache.    REVIEW OF SYSTEMS:    CONSTITUTIONAL: No weakness, fevers or chills  EYES/ENT: No visual changes;  No vertigo   NECK: No pain or stiffness  RESPIRATORY: No cough, wheezing, hemoptysis; No shortness of breath  CARDIOVASCULAR: No chest pain or palpitations  GASTROINTESTINAL: + generalized abdominal pain. + nausea and vomiting. No hematemesis; No diarrhea or constipation. No melena or hematochezia.  GENITOURINARY: No dysuria, frequency or hematuria  NEUROLOGICAL: No numbness or weakness  SKIN: No itching, rashes      Objective:    MEDICATIONS  (STANDING):  buprenorphine 8 mG/naloxone 2 mG SL  Tablet 1 Tablet(s) SubLingual <User Schedule>  enoxaparin Injectable 40 milliGRAM(s) SubCutaneous daily  nicotine -   7 mG/24Hr(s) Patch 1 patch Transdermal daily  sodium chloride 0.9%. 1000 milliLiter(s) (75 mL/Hr) IV Continuous <Continuous>    MEDICATIONS  (PRN):  acetaminophen   Tablet .. 650 milliGRAM(s) Oral every 4 hours PRN Mild Pain (1 - 3)  cloNIDine 0.1 milliGRAM(s) Oral every 4 hours PRN opioid withdrawal  ondansetron Injectable 4 milliGRAM(s) IV Push every 6 hours PRN Nausea        Vitals: Vital Signs Last 24 Hrs  T(C): 36.6 (08-10-20 @ 05:39), Max: 37 (08-10-20 @ 02:56)  T(F): 97.8 (08-10-20 @ 05:39), Max: 98.6 (08-10-20 @ 02:56)  HR: 82 (08-10-20 @ 05:39) (59 - 84)  BP: 148/98 (08-10-20 @ 05:39) (127/74 - 150/98)  BP(mean): --  RR: 18 (08-10-20 @ 05:39) (16 - 20)  SpO2: 100% (08-10-20 @ 05:39) (99% - 100%)            I&O's Summary    09 Aug 2020 07:01  -  10 Aug 2020 07:00  --------------------------------------------------------  IN: 525 mL / OUT: 0 mL / NET: 525 mL        PHYSICAL EXAM:  GENERAL: NAD, in distress due to withdrawal  HEAD:  Atraumatic, Normocephalic  EYES: pupils equally reactive to light, pupil size of 5 mm bilaterally  CHEST/LUNG: Clear to ascultation bilaterally; No rales, rhonchi, wheezing, or rubs  HEART: Regular rate and rhythm; No murmurs, rubs, or gallops  ABDOMEN: Soft, Nontender, Nondistended; normoactive bowel sounds in all quadrants  MSK: no tenderness upon palpation on b/l LE   SKIN: No rashes or lesions, no piloerection  NERVOUS SYSTEM:  Alert & Oriented X3, no focal deficit, no tremors b/l upper extremities    LABS:  08-10    141  |  103  |  10  ----------------------------<  112<H>  3.6   |  23  |  0.81  08-09    144  |  105  |  11  ----------------------------<  106<H>  3.9   |  27  |  0.86    Ca    9.7      10 Aug 2020 07:23  Ca    9.1      09 Aug 2020 23:33    TPro  6.8  /  Alb  4.5  /  TBili  0.7  /  DBili  x   /  AST  22  /  ALT  16  /  AlkPhos  66  08-10  TPro  7.0  /  Alb  4.7  /  TBili  0.6  /  DBili  x   /  AST  20  /  ALT  16  /  AlkPhos  66  08-09                                              14.6   9.92  )-----------( 221      ( 10 Aug 2020 07:23 )             41.7                         14.4   9.76  )-----------( 205      ( 09 Aug 2020 23:33 )             41.2     CAPILLARY BLOOD GLUCOSE          Consultants involved in case:  Psychiatrist  Consultant(s) Notes Reviewed:  [ x] YES  [ ] NO:   Behavioral Health note reviewed 8/10  Care Discussed with Consultants/Other Providers [x ] YES  [ ] NO

## 2020-08-10 NOTE — BEHAVIORAL HEALTH ASSESSMENT NOTE - NSBHCHARTREVIEWVS_PSY_A_CORE FT
Vital Signs Last 24 Hrs  T(C): 36.6 (10 Aug 2020 05:39), Max: 37 (10 Aug 2020 02:56)  T(F): 97.8 (10 Aug 2020 05:39), Max: 98.6 (10 Aug 2020 02:56)  HR: 82 (10 Aug 2020 05:39) (59 - 84)  BP: 148/98 (10 Aug 2020 05:39) (127/74 - 150/98)  BP(mean): --  RR: 18 (10 Aug 2020 05:39) (16 - 20)  SpO2: 100% (10 Aug 2020 05:39) (99% - 100%)

## 2020-08-10 NOTE — H&P ADULT - PROBLEM SELECTOR PLAN 2
h/o PE, per history unprovoked , no longer on AC  , currently without chest pain or SOB  , no immediate indication for restarting AC,  can f/u w/ PMD  as scheduled

## 2020-08-10 NOTE — PROGRESS NOTE ADULT - PROBLEM SELECTOR PLAN 1
- COWS 18 on admission , s/p Suboxone 2mg/0.5mg SL tab x2, s/p 1L IV fluid  - COWS 14 this AM, in moderate withdrawal  - Consulted Psych for guidance with opiate withdrawal, appreciate recs  - Received Suboxone 8mg/2mg ~12pm. Will give one more at 10 pm  - Will give suboxone 8mg/2mg TID starting tomorrow  - Will put Zofran 4 mg q8 hrn for vomiting/nausea max 12 mg in 24 hrs  - Will have Immodium 2 mg q 12hr PRN diarrhea, until improvement of sxs  - Will have clonidine 0.1 mg q 12 hr PRN with parameter HOLD for SBP < 100/60  - Will have Flexeril 10 mg q 12hr PRN for muscle spasms  - Will have Ibuprofen 400 mg q6 hr PRN for pain  - Will f/u tox screen   - Will consult social work, put in SBIRT

## 2020-08-10 NOTE — BEHAVIORAL HEALTH ASSESSMENT NOTE - VIOLENCE PROTECTIVE FACTORS:
Residential stability/Employment stability/Engagement in treatment/Affective Stability/Good treatment response/compliance/Relationship stability/Insight into violence risk and need for management/treatment

## 2020-08-10 NOTE — BEHAVIORAL HEALTH ASSESSMENT NOTE - SUICIDE PROTECTIVE FACTORS
Has future plans/Identifies reasons for living/Supportive social network of family or friends/Fear of death or the actual act of killing self/Engaged in work or school/Positive therapeutic relationships/Cultural, spiritual and/or moral attitudes against suicide/Responsibility to family and others/Ability to cope with stress/Frustration tolerance

## 2020-08-10 NOTE — PROGRESS NOTE ADULT - PROBLEM SELECTOR PLAN 4
- h/o PE, per history unprovoked , no longer on AC. Took AC for a month  - Will monitor for symptoms

## 2020-08-10 NOTE — H&P ADULT - PROBLEM SELECTOR PLAN 1
COWS 18 on admission , s/p Suboxone 2mg/0.5mg SL tab , unable to tolerate PO intake at home , will require inpatient hydration and symptom management   - clonidine PRN   - continue Suboxone   - Addictionist Counseling consult - to be arranged by day team   - Zofran PRN   - Advance diet as tolerates    - s/p 1 L IV fluid , additional IV fluids as needed
room air

## 2020-08-10 NOTE — H&P ADULT - NSHPSOCIALHISTORY_GEN_ALL_CORE
Social History:    Marital Status:  (   )    (  x ) Single    (   )    (  )   Occupation: [employer]   Lives with: ( x ) alone  (  ) children   (  ) spouse   (  ) parents  (  ) other    Substance Use (street drugs): (  ) never used  ( x ) other: See HPI   Tobacco Usage:  (   ) never smoked   (   ) former smoker   ( x  ) current smoker  , 4-5 cigarettes daily  (     ) pack year  (        ) last cigarette date  Alcohol Usage: no etoh use

## 2020-08-10 NOTE — MEDICAL STUDENT PROGRESS NOTE(EDUCATION) - SUBJECTIVE AND OBJECTIVE BOX
GRACIELA RAGLAND  35y  MRN: [id number]    Patient is a 35y old  Male w/ pmhx of PE not on a/c, who presents with a chief complaint of opioid withdrawal (10 Aug 2020 01:31)  Subjective: Pt was admitted last night for opiate withdrawal. States that he last snorted heroin saturday night (did not know exact amount, no injection use), then on sunday he started to experience withdrawal sxs and came to hospital. This morning, the pt reports he feels "awful". States that he has "20/10" bone pain in his legs, watery diarrhea every 30 minutes to 1 hour, feeling of anxiety, some occasional goosebumps, severe nausea, dry heaves and clear/bilious vomiting. Reports generalized weakness as well, and the chills with some rhinorrhea.. He reports some yawning and that he had some tearing last night but not so much this AM. States that he has a diffuse mild abdominal pain with cramping with his diarrhea. He denies any shortness of breath or chest pain or palpitations at this time. Reports no tremors. Is interested in speaking to an addiction counselor, and would like HIV testing. He is most concerned about his leg pain and his inability to control his diarrhea this morning.    REVIEW OF SYSTEMS:    CONSTITUTIONAL: generalized weakness, chills.  EYES/ENT: No visual changes, occasional rhinorrhea and tearing  NECK: No pain  RESPIRATORY: No cough, wheezing, hemoptysis; No shortness of breath  CARDIOVASCULAR: No chest pain or palpitations  GASTROINTESTINA: nausea, vomiting, diarrhea, abdominal pain and cramping. No melena or hematochezia.  MSK: severe bone pain  GENITOURINARY: No dysuria  NEUROLOGICAL: No numbness or weakness  SKIN: occasional goosebumps, no rash      Objective:    MEDICATIONS  (STANDING):  buprenorphine 2 mG/naloxone 0.5 mG SL  Tablet 1 Tablet(s) SubLingual once  enoxaparin Injectable 40 milliGRAM(s) SubCutaneous daily  nicotine -   7 mG/24Hr(s) Patch 1 patch Transdermal daily  sodium chloride 0.9%. 1000 milliLiter(s) (75 mL/Hr) IV Continuous <Continuous>    MEDICATIONS  (PRN):  acetaminophen   Tablet .. 650 milliGRAM(s) Oral every 4 hours PRN Mild Pain (1 - 3)  cloNIDine 0.1 milliGRAM(s) Oral every 4 hours PRN opioid withdrawal  ondansetron Injectable 4 milliGRAM(s) IV Push every 6 hours PRN Nausea        Vitals: Vital Signs Last 24 Hrs  T(C): 36.6 (08-10-20 @ 05:39), Max: 37 (08-10-20 @ 02:56)  T(F): 97.8 (08-10-20 @ 05:39), Max: 98.6 (08-10-20 @ 02:56)  HR: 82 (08-10-20 @ 05:39) (59 - 84)  BP: 148/98 (08-10-20 @ 05:39) (127/74 - 150/98)  BP(mean): --  RR: 18 (08-10-20 @ 05:39) (16 - 20)  SpO2: 100% (08-10-20 @ 05:39) (99% - 100%)              I&O's Summary    09 Aug 2020 07:01  -  10 Aug 2020 07:00  --------------------------------------------------------  IN: 525 mL / OUT: 0 mL / NET: 525 mL        PHYSICAL EXAM:  GENERAL: well developed male in moderate distress, unable to get comfortable in bed, actively vomiting and dry heaving and running to bathroom w/ diarrhea  HEAD:  Atraumatic, Normocephalic  EYES: EOMI, pupils bilateral 5mm , equally reactive to light  LUNGS: Clear to auscultation bilaterally; No rales, rhonchi, wheezing  HEART: Regular rate and rhythm; No murmurs, rubs, or gallops  MSK: no muscle tenderness throughout, full range of motion, no point TTP  ABDOMEN: Soft, Nontender, bowel sounds are present. no bruits, no distention, no masses.  SKIN: No rashes or lesions. no piloerection noted  NERVOUS SYSTEM:  Alert & Oriented X3, no focal deficit    LABS:                        14.6   9.92  )-----------( 221      ( 10 Aug 2020 07:23 )             41.7     08-09    144  |  105  |  11  ----------------------------<  106<H>  3.9   |  27  |  0.86    Ca    9.1      09 Aug 2020 23:33    TPro  7.0  /  Alb  4.7  /  TBili  0.6  /  DBili  x   /  AST  20  /  ALT  16  /  AlkPhos  66  08-09                                              14.6   9.92  )-----------( 221      ( 10 Aug 2020 07:23 )             41.7                         14.4   9.76  )-----------( 205      ( 09 Aug 2020 23:33 )             41.2     CAPILLARY BLOOD GLUCOSE          RADIOLOGY & ADDITIONAL TESTS: GRACIELA RAGLAND  35y  MRN: [id number]    Patient is a 35y old  Male w/ pmhx of PE not on a/c, who presents with a chief complaint of opioid withdrawal (10 Aug 2020 01:31)  Subjective: Pt was admitted last night for opiate withdrawal. States that he last snorted heroin saturday night (did not know exact amount but states 7-10 times a day for the past 3-4 weeks 2/2 chilhood trauma, no injection use,) then on sunday he started to experience withdrawal sxs and came to hospital. This morning, the pt reports he feels "awful". States that he has "20/10" bone pain in his legs, watery diarrhea every 30 minutes to 1 hour, feeling of anxiety, some occasional goosebumps, severe nausea, dry heaves and clear/bilious vomiting. Reports generalized weakness as well, and the chills with some rhinorrhea.. He reports some yawning and that he had some tearing last night but not so much this AM. States that he has a diffuse mild abdominal pain with cramping with his diarrhea. He denies any shortness of breath or chest pain or palpitations at this time. Reports no tremors. Is interested in speaking to an addiction counselor, and would like HIV testing. He is most concerned about his leg pain and his inability to control his diarrhea this morning. No SI, HI, reports mood is "not great".     REVIEW OF SYSTEMS:    CONSTITUTIONAL: generalized weakness, chills.  EYES/ENT: No visual changes, occasional rhinorrhea and tearing  NECK: No pain  RESPIRATORY: No cough, wheezing, hemoptysis; No shortness of breath  CARDIOVASCULAR: No chest pain or palpitations  GASTROINTESTINA: nausea, vomiting, diarrhea, abdominal pain and cramping. No melena or hematochezia.  MSK: severe bone pain  GENITOURINARY: No dysuria  NEUROLOGICAL: No numbness or weakness  SKIN: occasional goosebumps, no rash      Objective:    MEDICATIONS  (STANDING):  buprenorphine 2 mG/naloxone 0.5 mG SL  Tablet 1 Tablet(s) SubLingual once  enoxaparin Injectable 40 milliGRAM(s) SubCutaneous daily  nicotine -   7 mG/24Hr(s) Patch 1 patch Transdermal daily  sodium chloride 0.9%. 1000 milliLiter(s) (75 mL/Hr) IV Continuous <Continuous>    MEDICATIONS  (PRN):  acetaminophen   Tablet .. 650 milliGRAM(s) Oral every 4 hours PRN Mild Pain (1 - 3)  cloNIDine 0.1 milliGRAM(s) Oral every 4 hours PRN opioid withdrawal  ondansetron Injectable 4 milliGRAM(s) IV Push every 6 hours PRN Nausea        Vitals: Vital Signs Last 24 Hrs  T(C): 36.6 (08-10-20 @ 05:39), Max: 37 (08-10-20 @ 02:56)  T(F): 97.8 (08-10-20 @ 05:39), Max: 98.6 (08-10-20 @ 02:56)  HR: 82 (08-10-20 @ 05:39) (59 - 84)  BP: 148/98 (08-10-20 @ 05:39) (127/74 - 150/98)  BP(mean): --  RR: 18 (08-10-20 @ 05:39) (16 - 20)  SpO2: 100% (08-10-20 @ 05:39) (99% - 100%)              I&O's Summary    09 Aug 2020 07:01  -  10 Aug 2020 07:00  --------------------------------------------------------  IN: 525 mL / OUT: 0 mL / NET: 525 mL        PHYSICAL EXAM:  GENERAL: well developed male in moderate distress, unable to get comfortable in bed, actively vomiting and dry heaving and running to bathroom w/ diarrhea  HEAD:  Atraumatic, Normocephalic  EYES: EOMI, pupils bilateral 5mm , equally reactive to light  LUNGS: Clear to auscultation bilaterally; No rales, rhonchi, wheezing  HEART: Regular rate and rhythm; No murmurs, rubs, or gallops  MSK: no muscle tenderness throughout, full range of motion, no point TTP  ABDOMEN: Soft, Nontender, bowel sounds are present. no bruits, no distention, no masses.  SKIN: No rashes or lesions. no piloerection noted  NERVOUS SYSTEM:  Alert & Oriented X3, no focal deficit    LABS:                        14.6   9.92  )-----------( 221      ( 10 Aug 2020 07:23 )             41.7     08-09    144  |  105  |  11  ----------------------------<  106<H>  3.9   |  27  |  0.86    Ca    9.1      09 Aug 2020 23:33    TPro  7.0  /  Alb  4.7  /  TBili  0.6  /  DBili  x   /  AST  20  /  ALT  16  /  AlkPhos  66  08-09                                              14.6   9.92  )-----------( 221      ( 10 Aug 2020 07:23 )             41.7                         14.4   9.76  )-----------( 205      ( 09 Aug 2020 23:33 )             41.2     CAPILLARY BLOOD GLUCOSE          RADIOLOGY & ADDITIONAL TESTS:

## 2020-08-10 NOTE — SBIRT NOTE ADULT - NSSBIRTSAVECONSULT_GEN_A_CORE
Complete/Patient reports snorting heroin, 15 bags daily, about every 30 minutes to avoid withdrawals. Patient denies other illicit drug use. Patient denies IVDU. Psychiatry assessed patient and induced Suboxone and recommends outpatient treatment for Suboxone maintenance. LMSW further discussed with patient who requests referral to [facility]. HIPAA form completed. Referral sent to [facility].

## 2020-08-10 NOTE — H&P ADULT - HISTORY OF PRESENT ILLNESS
Patient is a 35 year old male with a past medical history of PE not on AC , opioid abuse ,who presents for worsening withdrawal symptoms . Patient reports taking oxycodone in the past for back pain and during the past few weeks,  snorting  heroine. Over the past day he reports sweating, and several episodes of bilious vomiting,  shaking of his legs , lower extremity bone pain, runny nose, excessive yawning, no diarrhea. He is interested in detox.  He reports no fevers, chest pain, cough, sob, abdominal pain, back pain, dysuria, numbness, tingling, no history of  alcohol  use or IVDA,  no SI/HI.

## 2020-08-10 NOTE — H&P ADULT - NSHPREVIEWOFSYSTEMS_GEN_ALL_CORE
CONSTITUTIONAL: No weakness, fevers or chills  EYES/ENT: No visual changes;  No dysphagia  NECK: No pain or stiffness  RESPIRATORY: No cough, wheezing, hemoptysis; No shortness of breath  CARDIOVASCULAR: No chest pain or palpitations; No lower extremity edema  EXTREMITIES: no le edema, cyanosis, clubbing  GASTROINTESTINAL: + abdominal  pain. + nausea, + vomiting,  no  hematemesis; No diarrhea or constipation. No melena or hematochezia.  BACK: No back pain  GENITOURINARY: No dysuria, frequency or hematuria  NEUROLOGICAL: No numbness or weakness  MSK: +  leg  pain  SKIN: No itching, burning, rashes, or lesions   PSYCH: + agitation  All other review of systems is negative unless indicated above.

## 2020-08-10 NOTE — H&P ADULT - NSHPLABSRESULTS_GEN_ALL_CORE
Labs personally reviewed:                          14.4   9.76  )-----------( 205      ( 09 Aug 2020 23:33 )             41.2     08-09    144  |  105  |  11  ----------------------------<  106<H>  3.9   |  27  |  0.86    Ca    9.1      09 Aug 2020 23:33    TPro  7.0  /  Alb  4.7  /  TBili  0.6  /  DBili  x   /  AST  20  /  ALT  16  /  AlkPhos  66  08-09        LIVER FUNCTIONS - ( 09 Aug 2020 23:33 )  Alb: 4.7 g/dL / Pro: 7.0 g/dL / ALK PHOS: 66 U/L / ALT: 16 U/L / AST: 20 U/L / GGT: x               CAPILLARY BLOOD GLUCOSE          Imaging:    EKG personally reviewed:  sinus bradycardia at 50 bpm , no s-t segment elevation

## 2020-08-10 NOTE — H&P ADULT - PROBLEM SELECTOR PLAN 4
I personally provided 5 minutes of smoking cessation counseling, risk/harm of continued smoking and benefits of quitting discussed Patient agreed to use nicotine patch while inpatient   - nicotine patch

## 2020-08-10 NOTE — BEHAVIORAL HEALTH ASSESSMENT NOTE - CASE SUMMARY
34 yo male with h/o PE not on AC, opioid use dependence, no prior psychiatric history, no suicide attempts, no detox/rehab admission  admitted for management of withdrawal. Pt presents in acute withdrawal not adequately managed on Suboxone 2/0.5.   Impression   opioid use disorder, severe, currently withdrawal   Plan   can give Suboxone 8mg/2mg TID starting tomorrow Pt is a 34 yo man with h/o PE not on AC, opioid use dependence, no prior psychiatric history, no suicide attempts, no detox/rehab admission  admitted for management of withdrawal. Pt presents in acute withdrawal not adequately managed on Suboxone 2/0.5.   Impression   opioid use disorder, severe, currently withdrawal   I agree with the plan to give Suboxone 8mg/2mg TID starting tomorrow and to refer patient for outpt care.

## 2020-08-11 ENCOUNTER — TRANSCRIPTION ENCOUNTER (OUTPATIENT)
Age: 35
End: 2020-08-11

## 2020-08-11 VITALS
HEART RATE: 48 BPM | OXYGEN SATURATION: 95 % | TEMPERATURE: 98 F | SYSTOLIC BLOOD PRESSURE: 121 MMHG | RESPIRATION RATE: 18 BRPM | DIASTOLIC BLOOD PRESSURE: 72 MMHG

## 2020-08-11 LAB
ANION GAP SERPL CALC-SCNC: 16 MMOL/L — SIGNIFICANT CHANGE UP (ref 5–17)
BUN SERPL-MCNC: 10 MG/DL — SIGNIFICANT CHANGE UP (ref 7–23)
CALCIUM SERPL-MCNC: 9.7 MG/DL — SIGNIFICANT CHANGE UP (ref 8.4–10.5)
CHLORIDE SERPL-SCNC: 104 MMOL/L — SIGNIFICANT CHANGE UP (ref 96–108)
CO2 SERPL-SCNC: 24 MMOL/L — SIGNIFICANT CHANGE UP (ref 22–31)
CREAT SERPL-MCNC: 0.88 MG/DL — SIGNIFICANT CHANGE UP (ref 0.5–1.3)
GLUCOSE SERPL-MCNC: 104 MG/DL — HIGH (ref 70–99)
HCT VFR BLD CALC: 43.6 % — SIGNIFICANT CHANGE UP (ref 39–50)
HGB BLD-MCNC: 15.3 G/DL — SIGNIFICANT CHANGE UP (ref 13–17)
HIV 1+2 AB+HIV1 P24 AG SERPL QL IA: SIGNIFICANT CHANGE UP
MAGNESIUM SERPL-MCNC: 2.3 MG/DL — SIGNIFICANT CHANGE UP (ref 1.6–2.6)
MCHC RBC-ENTMCNC: 28.8 PG — SIGNIFICANT CHANGE UP (ref 27–34)
MCHC RBC-ENTMCNC: 35.1 GM/DL — SIGNIFICANT CHANGE UP (ref 32–36)
MCV RBC AUTO: 82 FL — SIGNIFICANT CHANGE UP (ref 80–100)
NRBC # BLD: 0 /100 WBCS — SIGNIFICANT CHANGE UP (ref 0–0)
PHOSPHATE SERPL-MCNC: 2.9 MG/DL — SIGNIFICANT CHANGE UP (ref 2.5–4.5)
PLATELET # BLD AUTO: 206 K/UL — SIGNIFICANT CHANGE UP (ref 150–400)
POTASSIUM SERPL-MCNC: 3.9 MMOL/L — SIGNIFICANT CHANGE UP (ref 3.5–5.3)
POTASSIUM SERPL-SCNC: 3.9 MMOL/L — SIGNIFICANT CHANGE UP (ref 3.5–5.3)
RBC # BLD: 5.32 M/UL — SIGNIFICANT CHANGE UP (ref 4.2–5.8)
RBC # FLD: 12.6 % — SIGNIFICANT CHANGE UP (ref 10.3–14.5)
SODIUM SERPL-SCNC: 144 MMOL/L — SIGNIFICANT CHANGE UP (ref 135–145)
WBC # BLD: 11.47 K/UL — HIGH (ref 3.8–10.5)
WBC # FLD AUTO: 11.47 K/UL — HIGH (ref 3.8–10.5)

## 2020-08-11 PROCEDURE — 87389 HIV-1 AG W/HIV-1&-2 AB AG IA: CPT

## 2020-08-11 PROCEDURE — 85027 COMPLETE CBC AUTOMATED: CPT

## 2020-08-11 PROCEDURE — 83735 ASSAY OF MAGNESIUM: CPT

## 2020-08-11 PROCEDURE — 99239 HOSP IP/OBS DSCHRG MGMT >30: CPT | Mod: GC

## 2020-08-11 PROCEDURE — 99232 SBSQ HOSP IP/OBS MODERATE 35: CPT

## 2020-08-11 PROCEDURE — 80048 BASIC METABOLIC PNL TOTAL CA: CPT

## 2020-08-11 PROCEDURE — 99285 EMERGENCY DEPT VISIT HI MDM: CPT | Mod: 25

## 2020-08-11 PROCEDURE — 96374 THER/PROPH/DIAG INJ IV PUSH: CPT

## 2020-08-11 PROCEDURE — 80307 DRUG TEST PRSMV CHEM ANLYZR: CPT

## 2020-08-11 PROCEDURE — 86769 SARS-COV-2 COVID-19 ANTIBODY: CPT

## 2020-08-11 PROCEDURE — 80053 COMPREHEN METABOLIC PANEL: CPT

## 2020-08-11 PROCEDURE — 84100 ASSAY OF PHOSPHORUS: CPT

## 2020-08-11 PROCEDURE — 93005 ELECTROCARDIOGRAM TRACING: CPT

## 2020-08-11 RX ORDER — BUPRENORPHINE AND NALOXONE 2; .5 MG/1; MG/1
1 TABLET SUBLINGUAL
Qty: 21 | Refills: 0
Start: 2020-08-11 | End: 2020-08-17

## 2020-08-11 RX ORDER — ONDANSETRON 8 MG/1
1 TABLET, FILM COATED ORAL
Qty: 15 | Refills: 0
Start: 2020-08-11 | End: 2020-08-15

## 2020-08-11 RX ADMIN — ENOXAPARIN SODIUM 40 MILLIGRAM(S): 100 INJECTION SUBCUTANEOUS at 13:29

## 2020-08-11 RX ADMIN — BUPRENORPHINE AND NALOXONE 1 TABLET(S): 2; .5 TABLET SUBLINGUAL at 04:43

## 2020-08-11 RX ADMIN — BUPRENORPHINE AND NALOXONE 1 TABLET(S): 2; .5 TABLET SUBLINGUAL at 13:29

## 2020-08-11 RX ADMIN — Medication 650 MILLIGRAM(S): at 03:00

## 2020-08-11 RX ADMIN — CYCLOBENZAPRINE HYDROCHLORIDE 10 MILLIGRAM(S): 10 TABLET, FILM COATED ORAL at 03:25

## 2020-08-11 RX ADMIN — Medication 650 MILLIGRAM(S): at 01:59

## 2020-08-11 RX ADMIN — Medication 1 PATCH: at 06:14

## 2020-08-11 RX ADMIN — Medication 0.1 MILLIGRAM(S): at 04:43

## 2020-08-11 RX ADMIN — ONDANSETRON 4 MILLIGRAM(S): 8 TABLET, FILM COATED ORAL at 02:01

## 2020-08-11 NOTE — DISCHARGE NOTE NURSING/CASE MANAGEMENT/SOCIAL WORK - PATIENT PORTAL LINK FT
You can access the FollowMyHealth Patient Portal offered by [facility] by registering at the following website: [email or web address]. By joining [facility]’s FollowMyHealth portal, you will also be able to view your health information using other applications (apps) compatible with our system.

## 2020-08-11 NOTE — DISCHARGE NOTE PROVIDER - HOSPITAL COURSE
Patient is a 35 year old male with a past medical history of PE not on AC , opioid abuse ,who presents for worsening withdrawal symptoms . Patient reports taking oxycodone in the past for back pain and during the past few weeks,  snorting  heroine. Over the past day he reports sweating, and several episodes of bilious vomiting,  shaking of his legs , lower extremity bone pain, runny nose, excessive yawning, no diarrhea. He is interested in detox.  He reports no fevers, chest pain, cough, sob, abdominal pain, back pain, dysuria, numbness, tingling, no history of  alcohol  use or IVDA,  no SI/HI.        Patient was going under opiate withdrawal. Psychiatry was consulted for withdrawal regimen. Patient is a 35 year old male with a past medical history of PE not on AC , opioid abuse ,who presents for worsening withdrawal symptoms . Patient reports taking oxycodone in the past for back pain and during the past few weeks,  snorting  heroine. Over the past day he reports sweating, and several episodes of bilious vomiting,  shaking of his legs , lower extremity bone pain, runny nose, excessive yawning, and diarrhea. He is interested in detox.  He reports no fevers, chest pain, cough, sob, abdominal pain, back pain, dysuria, numbness, tingling, no history of  alcohol  use or IVDA,  no SI/HI.        Patient was going under opiate withdrawal. Psychiatry was consulted for withdrawal regimen. Pt had COWS scores monitored throughout the stay, initially was 15 (moderate withdrawal) and after 1 day the patient was down to 7 (minor withdrawal). The patient was symptomatically treated with Suboxone 8mg/2mg BID on 8/10 which increased to TID 8/11. Pt also received zofran 4mg q8hrs PRN, immodium 2mg q12hrs prn, clonidine 0.1-0.2mg q12hrs prn, flexeril 10mg q12 prn, and ibuprofen 400mg q6h prn. SBIRT spoke with the patient regarding referral to [facility] program. Patient is a 35 year old male with a past medical history of PE not on AC, opioid abuse who presents for worsening withdrawal symptoms . Patient reported taking oxycodone in the past for back pain and during the past few weeks he reports snorting  heroine (15 bags a day.) Prior to admission he reports sweating, and several episodes of bilious vomiting,  shaking of his legs, lower extremity bone pain, runny nose, excessive yawning, and diarrhea. He is interested in detox.  He reports no fevers, chest pain, cough, sob, abdominal pain, back pain, dysuria, numbness, tingling, no history of  alcohol  use or IVDA,  no SI/HI.        Patient was admitted to medicine in the setting of opiate. Psychiatry was consulted for withdrawal regimen. Pt was monitored under the COWS protocol. During his stay, his score was 15 (moderate withdrawal) and after 1 day hi score decreased to 7 (minor withdrawal). The patient was treated with Suboxone 8mg/2mg .  Pt also received zofran 4mg q8hrs PRN, immodium 2mg q12hrs prn, clonidine 0.1-0.2mg q12hrs prn, flexeril 10mg q12 prn, and ibuprofen 400mg q6h prn. SBIRT spoke with the patient regarding referral to [facility] program. Patient has an appointment tomorrow at 2pm with [facility] program. Patient is a 35 year old male with a past medical history of PE not on AC, opioid abuse who presents for worsening withdrawal symptoms. Patient reported taking oxycodone in the past for back pain and during the past few weeks he reports snorting  heroine (15 bags a day.) Prior to admission he reports sweating, and several episodes of bilious vomiting,  shaking of his legs, lower extremity bone pain, runny nose, excessive yawning, and diarrhea. He is interested in detox.  He reports no fevers, chest pain, cough, sob, abdominal pain, back pain, dysuria, numbness, tingling, no history of  alcohol  use or IVDA,  no SI/HI.        Patient was admitted to medicine in the setting of opiate. Psychiatry was consulted for withdrawal regimen. Pt was monitored under the COWS protocol. During his stay, his score was 15 (moderate withdrawal) and after 1 day hi score decreased to 7 (minor withdrawal). The patient was treated with Suboxone 8mg/2mg .  Pt also received zofran 4mg q8hrs PRN, immodium 2mg q12hrs prn, clonidine 0.1-0.2mg q12hrs prn, flexeril 10mg q12 prn, and ibuprofen 400mg q6h prn. SBIRT spoke with the patient regarding referral to [facility] program. Patient has an appointment tomorrow at 2pm with [facility] program.

## 2020-08-11 NOTE — PROGRESS NOTE ADULT - ATTENDING COMMENTS
Pt seen and examined at the bedside  Agree with plan as above  Heroine withdrawl  Seen by Psych  Plan for subaxone and other supportive medications
Pt seen and examined  agree with above plan  Opoid withdrawl- improved symptoms today  Continue with subaxone  Await psych follow up

## 2020-08-11 NOTE — DISCHARGE NOTE NURSING/CASE MANAGEMENT/SOCIAL WORK - NSDCCRTYPESERV_GEN_ALL_CORE_FT
Appointment: Wednesday August 12, 2020 2:00pm. Please prepare for a series of phone calls for intake between 2:00pm to 4:00pm.

## 2020-08-11 NOTE — PROGRESS NOTE BEHAVIORAL HEALTH - NSBHFUPINTERVALHXFT_PSY_A_CORE
PT had been in opiate withdrawal before starting Suboxone 8/2mg TID.  HE is feeling better today and would like to continue the Suboxone.  He is with his mother at his bedside who also understands the risks and benefits of being on Suboxone. Pt says he already has an appointment at the [facility] program tomorrow at [facility] and his team here is coordinating his care.  PT was prescribed Suboxone 8/2mg SL TID for seven days, which was sent both to the [facility] here but his insurance does not cover this pharmacy, so the prescription was also sent to his pharmacy near his home. Pt is motivated to seek treatment and has no acute psychiatric symptoms and no SI.

## 2020-08-11 NOTE — PROGRESS NOTE ADULT - ASSESSMENT
35  M w/ h/o PE not on AC , opioid abuse ,who presents with opiate withdrawal 35  M w/ h/o PE not on AC , opioid abuse ,who presents with opiate withdrawal. Improving symptoms

## 2020-08-11 NOTE — PROGRESS NOTE BEHAVIORAL HEALTH - NSBHCHARTREVIEWLAB_PSY_A_CORE FT
15.3   11.47 )-----------( 206      ( 11 Aug 2020 09:12 )             43.6   08-11    144  |  104  |  10  ----------------------------<  104<H>  3.9   |  24  |  0.88    Ca    9.7      11 Aug 2020 09:12  Phos  2.9     08-11  Mg     2.3     08-11    TPro  6.8  /  Alb  4.5  /  TBili  0.7  /  DBili  x   /  AST  22  /  ALT  16  /  AlkPhos  66  08-10

## 2020-08-11 NOTE — PROGRESS NOTE ADULT - SUBJECTIVE AND OBJECTIVE BOX
***************************************************************  Nadia Pisano MD, PGY1 Resident  Internal Medicine   pager: [phone]  ***************************************************************    GRACIELA RAGLAND  35y  MRN: [id number]    Patient is a 35y old  Male who presents with a chief complaint of opioid withdrawal (10 Aug 2020 13:24)      Subjective: no events ON. Denies fever, CP, SOB, abn pain, N/V, dysuria. Tolerating diet.      REVIEW OF SYSTEMS:    CONSTITUTIONAL: No weakness, fevers or chills  EYES/ENT: No visual changes;  No vertigo   NECK: No pain or stiffness  RESPIRATORY: No cough, wheezing, hemoptysis; No shortness of breath  CARDIOVASCULAR: No chest pain or palpitations  GASTROINTESTINAL: + generalized abdominal pain. + nausea and vomiting. No hematemesis; No diarrhea or constipation. No melena or hematochezia.  GENITOURINARY: No dysuria, frequency or hematuria  NEUROLOGICAL: No numbness or weakness  SKIN: No itching, rashes      Objective:    MEDICATIONS  (STANDING):  buprenorphine 8 mG/naloxone 2 mG SL  Tablet 1 Tablet(s) SubLingual <User Schedule>  buprenorphine 8 mG/naloxone 2 mG SL  Tablet 1 Tablet(s) SubLingual <User Schedule>  enoxaparin Injectable 40 milliGRAM(s) SubCutaneous daily  nicotine -   7 mG/24Hr(s) Patch 1 patch Transdermal daily  sodium chloride 0.9%. 1000 milliLiter(s) (75 mL/Hr) IV Continuous <Continuous>    MEDICATIONS  (PRN):  acetaminophen   Tablet .. 650 milliGRAM(s) Oral every 4 hours PRN Mild Pain (1 - 3)  cloNIDine 0.1 milliGRAM(s) Oral every 12 hours PRN opioid withdrawal  cyclobenzaprine 10 milliGRAM(s) Oral three times a day PRN Muscle Spasm  ibuprofen  Tablet. 600 milliGRAM(s) Oral every 6 hours PRN Temp greater or equal to 38C (100.4F), Moderate Pain (4 - 6)  loperamide 2 milliGRAM(s) Oral every 12 hours PRN Diarrhea  ondansetron Injectable 4 milliGRAM(s) IV Push every 8 hours PRN Nausea        Vitals: Vital Signs Last 24 Hrs  T(C): 37.3 (08-11-20 @ 04:09), Max: 37.3 (08-11-20 @ 04:09)  T(F): 99.2 (08-11-20 @ 04:09), Max: 99.2 (08-11-20 @ 04:09)  HR: 62 (08-11-20 @ 04:40) (46 - 62)  BP: 114/72 (08-11-20 @ 04:40) (110/68 - 129/72)  BP(mean): --  RR: 18 (08-11-20 @ 04:09) (18 - 18)  SpO2: 97% (08-11-20 @ 04:09) (97% - 98%)            I&O's Summary    09 Aug 2020 07:01  -  10 Aug 2020 07:00  --------------------------------------------------------  IN: 525 mL / OUT: 0 mL / NET: 525 mL    10 Aug 2020 07:01  -  11 Aug 2020 06:54  --------------------------------------------------------  IN: 160 mL / OUT: 0 mL / NET: 160 mL        PHYSICAL EXAM:  GENERAL: NAD, in distress due to withdrawal  HEAD:  Atraumatic, Normocephalic  EYES: pupils equally reactive to light, pupil size of 5 mm bilaterally  CHEST/LUNG: Clear to ascultation bilaterally; No rales, rhonchi, wheezing, or rubs  HEART: Regular rate and rhythm; No murmurs, rubs, or gallops  ABDOMEN: Soft, Nontender, Nondistended; normoactive bowel sounds in all quadrants  MSK: no tenderness upon palpation on b/l LE   SKIN: No rashes or lesions, no piloerection  NERVOUS SYSTEM:  Alert & Oriented X3, no focal deficit, no tremors b/l upper extremities      LABS:  08-10    141  |  103  |  10  ----------------------------<  112<H>  3.6   |  23  |  0.81  08-09    144  |  105  |  11  ----------------------------<  106<H>  3.9   |  27  |  0.86    Ca    9.7      10 Aug 2020 07:23  Ca    9.1      09 Aug 2020 23:33    TPro  6.8  /  Alb  4.5  /  TBili  0.7  /  DBili  x   /  AST  22  /  ALT  16  /  AlkPhos  66  08-10  TPro  7.0  /  Alb  4.7  /  TBili  0.6  /  DBili  x   /  AST  20  /  ALT  16  /  AlkPhos  66  08-09                                              14.6   9.92  )-----------( 221      ( 10 Aug 2020 07:23 )             41.7                         14.4   9.76  )-----------( 205      ( 09 Aug 2020 23:33 )             41.2     CAPILLARY BLOOD GLUCOSE          RADIOLOGY & ADDITIONAL TESTS:    Imaging Personally Reviewed:  [x ] YES  [ ] NO    Consultants involved in case:   Consultant(s) Notes Reviewed:  [ x] YES  [ ] NO:   Care Discussed with Consultants/Other Providers [x ] YES  [ ] NO ***************************************************************  Nadia Pisano MD, PGY1 Resident  Internal Medicine   pager: [phone]  ***************************************************************    GRACIELA RAGLAND    Patient is a 35y old  Male who presents with a chief complaint of opioid withdrawal (10 Aug 2020 13:24)      Subjective:     No events ON. Denies fever, CP, SOB, N/V, dysuria.  COWS 6, undergoing mild withdrawal. Reports generalized abdominal cramps.  Did not eat much yesterday due to nausea and vomiting. Wants to try eating.    Also reports mild muscle cramps from hip down and causes frequent toss and turn on bed.  Overall feels better.    REVIEW OF SYSTEMS:    CONSTITUTIONAL: No weakness, fevers. + chills  EYES/ENT: No visual changes;  No vertigo   NECK: No pain or stiffness  RESPIRATORY: No cough, wheezing, hemoptysis; No shortness of breath  CARDIOVASCULAR: No chest pain or palpitations  GASTROINTESTINAL: + generalized abdominal pain. No nausea or vomiting. No hematemesis; No diarrhea or constipation. No melena or hematochezia.  GENITOURINARY: No dysuria, frequency or hematuria  NEUROLOGICAL: No numbness or weakness  SKIN: No itching, rashes      Objective:    MEDICATIONS  (STANDING):  buprenorphine 8 mG/naloxone 2 mG SL  Tablet 1 Tablet(s) SubLingual <User Schedule>  buprenorphine 8 mG/naloxone 2 mG SL  Tablet 1 Tablet(s) SubLingual <User Schedule>  enoxaparin Injectable 40 milliGRAM(s) SubCutaneous daily  nicotine -   7 mG/24Hr(s) Patch 1 patch Transdermal daily  sodium chloride 0.9%. 1000 milliLiter(s) (75 mL/Hr) IV Continuous <Continuous>    MEDICATIONS  (PRN):  acetaminophen   Tablet .. 650 milliGRAM(s) Oral every 4 hours PRN Mild Pain (1 - 3)  cloNIDine 0.1 milliGRAM(s) Oral every 12 hours PRN opioid withdrawal  cyclobenzaprine 10 milliGRAM(s) Oral three times a day PRN Muscle Spasm  ibuprofen  Tablet. 600 milliGRAM(s) Oral every 6 hours PRN Temp greater or equal to 38C (100.4F), Moderate Pain (4 - 6)  loperamide 2 milliGRAM(s) Oral every 12 hours PRN Diarrhea  ondansetron Injectable 4 milliGRAM(s) IV Push every 8 hours PRN Nausea        Vitals: Vital Signs Last 24 Hrs  T(C): 37.3 (08-11-20 @ 04:09), Max: 37.3 (08-11-20 @ 04:09)  T(F): 99.2 (08-11-20 @ 04:09), Max: 99.2 (08-11-20 @ 04:09)  HR: 62 (08-11-20 @ 04:40) (46 - 62)  BP: 114/72 (08-11-20 @ 04:40) (110/68 - 129/72)  BP(mean): --  RR: 18 (08-11-20 @ 04:09) (18 - 18)  SpO2: 97% (08-11-20 @ 04:09) (97% - 98%)            I&O's Summary    09 Aug 2020 07:01  -  10 Aug 2020 07:00  --------------------------------------------------------  IN: 525 mL / OUT: 0 mL / NET: 525 mL    10 Aug 2020 07:01  -  11 Aug 2020 06:54  --------------------------------------------------------  IN: 160 mL / OUT: 0 mL / NET: 160 mL        PHYSICAL EXAM:  GENERAL: NAD, comfortable in bed without acute distress  HEAD:  Atraumatic, Normocephalic  EYES: pupils equally reactive to light, normal pupil size  CHEST/LUNG: Clear to ascultation bilaterally; No rales, rhonchi, wheezing, or rubs  HEART: Regular rate and rhythm; No murmurs, rubs, or gallops; no edema in b/l LE  ABDOMEN: Soft, nontender upon palpation, Nondistended; normoactive bowel sounds in all quadrants  MSK: no tenderness upon palpation on b/l LE  SKIN: No rashes or lesions, no piloerection  NERVOUS SYSTEM:  Alert & Oriented X3, no focal deficit, no tremors b/l upper extremities      LABS:  08-10    141  |  103  |  10  ----------------------------<  112<H>  3.6   |  23  |  0.81  08-09    144  |  105  |  11  ----------------------------<  106<H>  3.9   |  27  |  0.86    Ca    9.7      10 Aug 2020 07:23  Ca    9.1      09 Aug 2020 23:33    TPro  6.8  /  Alb  4.5  /  TBili  0.7  /  DBili  x   /  AST  22  /  ALT  16  /  AlkPhos  66  08-10  TPro  7.0  /  Alb  4.7  /  TBili  0.6  /  DBili  x   /  AST  20  /  ALT  16  /  AlkPhos  66  08-09                                              14.6   9.92  )-----------( 221      ( 10 Aug 2020 07:23 )             41.7                         14.4   9.76  )-----------( 205      ( 09 Aug 2020 23:33 )             41.2     CAPILLARY BLOOD GLUCOSE          RADIOLOGY & ADDITIONAL TESTS:    Imaging Personally Reviewed:  [x ] YES  [ ] NO    Consultants involved in case: Psychiatry  Consultant(s) Notes Reviewed:  [ x] YES  [ ] NO:   Psychiatry note reviewed 8/10. Pending 8/11 note  Care Discussed with Consultants/Other Providers [x ] YES  [ ] NO

## 2020-08-11 NOTE — PROGRESS NOTE BEHAVIORAL HEALTH - MOOD
Problem:  CARE  Goal: Vital signs are medically acceptable  2020 2143 by Nay Hightower RN  Outcome: Ongoing  2020 1359 by Kishan Coats RN  Outcome: Ongoing  Goal: Thermoregulation maintained greater than 97/less than 99.4 Ax  2020 2143 by Nay Hightower RN  Outcome: Ongoing  2020 1359 by Kishan Coats RN  Outcome: Ongoing  Goal: Infant exhibits minimal/reduced signs of pain/discomfort  2020 2143 by Nay Hightower RN  Outcome: Ongoing  2020 1359 by Kishan Coats RN  Outcome: Ongoing  Goal: Infant is maintained in safe environment  2020 2143 by Nay Hightower RN  Outcome: Ongoing  2020 135 by Kishan Coats RN  Outcome: Ongoing  Goal: Baby is with Mother and family  2020 2143 by Nay Hightower RN  Outcome: Ongoing  2020 135 by Kishan Coats RN  Outcome: Ongoing     Problem: Nutritional:  Goal: Knowledge of adequate nutritional intake and output  Description: Knowledge of adequate nutritional intake and output  2020 2143 by Nay Hightower RN  Outcome: Ongoing  2020 1541 by Teresa Sierra RN  Outcome: Ongoing  Goal: Exclusively   Description: Exclusively   2020 2143 by Nay Hightower RN  Outcome: Ongoing  2020 1541 by Teresa Sierra RN  Outcome: Ongoing  Goal: Knowledge of breastfeeding  Description: Knowledge of breastfeeding  Outcome: Ongoing  Goal: Knowledge of infant formula  Description: Knowledge of infant formula  2020 2143 by Nay Hightower RN  Outcome: Ongoing  2020 1541 by Teresa Sierra RN  Outcome: Ongoing  Goal: Knowledge of infant feeding cues  Description: Knowledge of infant feeding cues  2020 1541 by Teresa Sierra RN  Outcome: Completed Normal

## 2020-08-11 NOTE — DISCHARGE NOTE PROVIDER - PROVIDER TOKENS
FREE:[LAST:[Rolan Fofana],PHONE:[phone]],FAX:[(   )    -]],FREE:[LAST:[.],FIRST:[.],PHONE:[phone]],FAX:[(   )    -],ADDRESS:[[facility] [place] [address] [address] [place] [address]]]

## 2020-08-11 NOTE — DISCHARGE NOTE NURSING/CASE MANAGEMENT/SOCIAL WORK - NSDCFUADDAPPT_GEN_ALL_CORE_FT
Please follow up with your primary care doctor within a week of discharge.  Please follow up with the [facility] program tomorrow at 2pm for your telephone appointment.

## 2020-08-11 NOTE — MEDICAL STUDENT PROGRESS NOTE(EDUCATION) - SUBJECTIVE AND OBJECTIVE BOX
GRACIELA RAGLAND  35y  MRN: [id number]    Patient is a 35y old  Male who presents with a chief complaint of opioid withdrawal (10 Aug 2020 13:24)  Subjective: no events overnight.    REVIEW OF SYSTEMS:    CONSTITUTIONAL: No weakness, fevers or chills  EYES/ENT: No visual changes;  No vertigo or throat pain   NECK: No pain or stiffness  RESPIRATORY: No cough, wheezing, hemoptysis; No shortness of breath  CARDIOVASCULAR: No chest pain or palpitations  GASTROINTESTINAL: No abdominal or epigastric pain. No nausea, vomiting, or hematemesis; No diarrhea or constipation. No melena or hematochezia.  GENITOURINARY: No dysuria, frequency or hematuria  NEUROLOGICAL: No numbness or weakness  SKIN: No itching, rashes      Objective:    MEDICATIONS  (STANDING):  buprenorphine 8 mG/naloxone 2 mG SL  Tablet 1 Tablet(s) SubLingual <User Schedule>  buprenorphine 8 mG/naloxone 2 mG SL  Tablet 1 Tablet(s) SubLingual <User Schedule>  enoxaparin Injectable 40 milliGRAM(s) SubCutaneous daily  nicotine -   7 mG/24Hr(s) Patch 1 patch Transdermal daily  sodium chloride 0.9%. 1000 milliLiter(s) (75 mL/Hr) IV Continuous <Continuous>    MEDICATIONS  (PRN):  acetaminophen   Tablet .. 650 milliGRAM(s) Oral every 4 hours PRN Mild Pain (1 - 3)  cloNIDine 0.1 milliGRAM(s) Oral every 12 hours PRN opioid withdrawal  cyclobenzaprine 10 milliGRAM(s) Oral three times a day PRN Muscle Spasm  ibuprofen  Tablet. 600 milliGRAM(s) Oral every 6 hours PRN Temp greater or equal to 38C (100.4F), Moderate Pain (4 - 6)  loperamide 2 milliGRAM(s) Oral every 12 hours PRN Diarrhea  ondansetron Injectable 4 milliGRAM(s) IV Push every 8 hours PRN Nausea        Vitals: Vital Signs Last 24 Hrs  T(C): 37.3 (08-11-20 @ 04:09), Max: 37.3 (08-11-20 @ 04:09)  T(F): 99.2 (08-11-20 @ 04:09), Max: 99.2 (08-11-20 @ 04:09)  HR: 62 (08-11-20 @ 04:40) (46 - 62)  BP: 114/72 (08-11-20 @ 04:40) (110/68 - 129/72)  BP(mean): --  RR: 18 (08-11-20 @ 04:09) (18 - 18)  SpO2: 97% (08-11-20 @ 04:09) (97% - 98%)              I&O's Summary    09 Aug 2020 07:01  -  10 Aug 2020 07:00  --------------------------------------------------------  IN: 525 mL / OUT: 0 mL / NET: 525 mL    10 Aug 2020 07:01  -  11 Aug 2020 06:54  --------------------------------------------------------  IN: 160 mL / OUT: 0 mL / NET: 160 mL        PHYSICAL EXAM:  GENERAL: NAD  HEAD:  Atraumatic, Normocephalic  EYES: EOMI, conjunctiva and sclera clear  LUNGS: Clear to auscultation bilaterally; No rales, rhonchi, wheezing  HEART: Regular rate and rhythm; No murmurs, rubs, or gallops  ABDOMEN: Soft, Nontender, Nondistended;   SKIN: No rashes or lesions  NERVOUS SYSTEM:  Alert & Oriented X3, no focal deficit    LABS:  08-10    141  |  103  |  10  ----------------------------<  112<H>  3.6   |  23  |  0.81  08-09    144  |  105  |  11  ----------------------------<  106<H>  3.9   |  27  |  0.86    Ca    9.7      10 Aug 2020 07:23  Ca    9.1      09 Aug 2020 23:33    TPro  6.8  /  Alb  4.5  /  TBili  0.7  /  DBili  x   /  AST  22  /  ALT  16  /  AlkPhos  66  08-10  TPro  7.0  /  Alb  4.7  /  TBili  0.6  /  DBili  x   /  AST  20  /  ALT  16  /  AlkPhos  66  08-09                                              14.6   9.92  )-----------( 221      ( 10 Aug 2020 07:23 )             41.7                         14.4   9.76  )-----------( 205      ( 09 Aug 2020 23:33 )             41.2     CAPILLARY BLOOD GLUCOSE          RADIOLOGY & ADDITIONAL TESTS: GRACIELA RAGLAND  35y  MRN: [id number]    Patient is a 35y old  Male who presents with a chief complaint of opioid withdrawal (10 Aug 2020 13:24)  Subjective: no events overnight states that he is feeling much better, has had some stomach cramps, but no recent n/v/d. States that his main concern is his leg pain which is improving but he states that the medications are helping. Has spoken to psychiatry and he is happy with his treatment plan and referral to the [facility] program. reports some occasional chills and states that he sometimes is uncomfortable in bed and shifts around.     REVIEW OF SYSTEMS:    CONSTITUTIONAL: occasional chills  EYES/ENT: No visual changes;  NECK: No pain  RESPIRATORY: No cough, wheezing, hemoptysis; No shortness of breath  CARDIOVASCULAR: No chest pain or palpitations   GASTROINTESTINAL: occasional stomach cramping  GENITOURINARY: No dysuria, frequency or hematuria  NEUROLOGICAL: No numbness or weakness  SKIN: No itching, rashes      Objective:    MEDICATIONS  (STANDING):  buprenorphine 8 mG/naloxone 2 mG SL  Tablet 1 Tablet(s) SubLingual <User Schedule>  buprenorphine 8 mG/naloxone 2 mG SL  Tablet 1 Tablet(s) SubLingual <User Schedule>  enoxaparin Injectable 40 milliGRAM(s) SubCutaneous daily  nicotine -   7 mG/24Hr(s) Patch 1 patch Transdermal daily  sodium chloride 0.9%. 1000 milliLiter(s) (75 mL/Hr) IV Continuous <Continuous>    MEDICATIONS  (PRN):  acetaminophen   Tablet .. 650 milliGRAM(s) Oral every 4 hours PRN Mild Pain (1 - 3)  cloNIDine 0.1 milliGRAM(s) Oral every 12 hours PRN opioid withdrawal  cyclobenzaprine 10 milliGRAM(s) Oral three times a day PRN Muscle Spasm  ibuprofen  Tablet. 600 milliGRAM(s) Oral every 6 hours PRN Temp greater or equal to 38C (100.4F), Moderate Pain (4 - 6)  loperamide 2 milliGRAM(s) Oral every 12 hours PRN Diarrhea  ondansetron Injectable 4 milliGRAM(s) IV Push every 8 hours PRN Nausea        Vitals: Vital Signs Last 24 Hrs  T(C): 37.3 (08-11-20 @ 04:09), Max: 37.3 (08-11-20 @ 04:09)  T(F): 99.2 (08-11-20 @ 04:09), Max: 99.2 (08-11-20 @ 04:09)  HR: 62 (08-11-20 @ 04:40) (46 - 62)  BP: 114/72 (08-11-20 @ 04:40) (110/68 - 129/72)  BP(mean): --  RR: 18 (08-11-20 @ 04:09) (18 - 18)  SpO2: 97% (08-11-20 @ 04:09) (97% - 98%)              I&O's Summary    09 Aug 2020 07:01  -  10 Aug 2020 07:00  --------------------------------------------------------  IN: 525 mL / OUT: 0 mL / NET: 525 mL    10 Aug 2020 07:01  -  11 Aug 2020 06:54  --------------------------------------------------------  IN: 160 mL / OUT: 0 mL / NET: 160 mL        PHYSICAL EXAM:  GENERAL: NAD  HEAD:  Atraumatic, Normocephalic  EYES: EOMI, conjunctiva and sclera clear  LUNGS: Clear to auscultation bilaterally; No rales, rhonchi, wheezing  HEART: Regular rate and rhythm; No murmurs, rubs, or gallops  ABDOMEN: Soft, Nontender, Nondistended;   SKIN: No rashes or lesions  NERVOUS SYSTEM:  Alert & Oriented X3, no focal deficit    LABS:  08-10    141  |  103  |  10  ----------------------------<  112<H>  3.6   |  23  |  0.81  08-09    144  |  105  |  11  ----------------------------<  106<H>  3.9   |  27  |  0.86    Ca    9.7      10 Aug 2020 07:23  Ca    9.1      09 Aug 2020 23:33    TPro  6.8  /  Alb  4.5  /  TBili  0.7  /  DBili  x   /  AST  22  /  ALT  16  /  AlkPhos  66  08-10  TPro  7.0  /  Alb  4.7  /  TBili  0.6  /  DBili  x   /  AST  20  /  ALT  16  /  AlkPhos  66  08-09                                              14.6   9.92  )-----------( 221      ( 10 Aug 2020 07:23 )             41.7                         14.4   9.76  )-----------( 205      ( 09 Aug 2020 23:33 )             41.2     CAPILLARY BLOOD GLUCOSE          RADIOLOGY & ADDITIONAL TESTS: GRACIELA RAGLAND  35y  MRN: [id number]    Patient is a 35y old  Male who presents with a chief complaint of opioid withdrawal (10 Aug 2020 13:24)  Subjective: no events overnight states that he is feeling much better, has had some stomach cramps, but no recent n/v/d. States that his main concern is his leg pain which is improving but he states that the medications are helping. Has spoken to psychiatry and he is happy with his treatment plan and referral to the [facility] program. reports some occasional chills and states that he sometimes is uncomfortable in bed and shifts around. Reports some anxiety. last n/v/d episodes were late last night, was able to eat breakfast    REVIEW OF SYSTEMS:    CONSTITUTIONAL: occasional chills  EYES/ENT: No visual changes;  NECK: No pain  RESPIRATORY: No cough, wheezing, hemoptysis; No shortness of breath  CARDIOVASCULAR: No chest pain or palpitations   GASTROINTESTINAL: occasional stomach cramping  GENITOURINARY: No dysuria, frequency or hematuria  NEUROLOGICAL: No numbness or weakness  SKIN: No itching, rashes  PSYCH: anxiety    Objective:    MEDICATIONS  (STANDING):  buprenorphine 8 mG/naloxone 2 mG SL  Tablet 1 Tablet(s) SubLingual <User Schedule>  buprenorphine 8 mG/naloxone 2 mG SL  Tablet 1 Tablet(s) SubLingual <User Schedule>  enoxaparin Injectable 40 milliGRAM(s) SubCutaneous daily  nicotine -   7 mG/24Hr(s) Patch 1 patch Transdermal daily  sodium chloride 0.9%. 1000 milliLiter(s) (75 mL/Hr) IV Continuous <Continuous>    MEDICATIONS  (PRN):  acetaminophen   Tablet .. 650 milliGRAM(s) Oral every 4 hours PRN Mild Pain (1 - 3)  cloNIDine 0.1 milliGRAM(s) Oral every 12 hours PRN opioid withdrawal  cyclobenzaprine 10 milliGRAM(s) Oral three times a day PRN Muscle Spasm  ibuprofen  Tablet. 600 milliGRAM(s) Oral every 6 hours PRN Temp greater or equal to 38C (100.4F), Moderate Pain (4 - 6)  loperamide 2 milliGRAM(s) Oral every 12 hours PRN Diarrhea  ondansetron Injectable 4 milliGRAM(s) IV Push every 8 hours PRN Nausea        Vitals: Vital Signs Last 24 Hrs  T(C): 37.3 (08-11-20 @ 04:09), Max: 37.3 (08-11-20 @ 04:09)  T(F): 99.2 (08-11-20 @ 04:09), Max: 99.2 (08-11-20 @ 04:09)  HR: 62 (08-11-20 @ 04:40) (46 - 62)  BP: 114/72 (08-11-20 @ 04:40) (110/68 - 129/72)  BP(mean): --  RR: 18 (08-11-20 @ 04:09) (18 - 18)  SpO2: 97% (08-11-20 @ 04:09) (97% - 98%)              I&O's Summary    09 Aug 2020 07:01  -  10 Aug 2020 07:00  --------------------------------------------------------  IN: 525 mL / OUT: 0 mL / NET: 525 mL    10 Aug 2020 07:01  -  11 Aug 2020 06:54  --------------------------------------------------------  IN: 160 mL / OUT: 0 mL / NET: 160 mL        PHYSICAL EXAM:  GENERAL: NAD  HEAD:  Atraumatic, Normocephalic  EYES: EOMI, conjunctiva and sclera clear  LUNGS: Clear to auscultation bilaterally; No rales, rhonchi, wheezing  HEART: Regular rate and rhythm; No murmurs, rubs, or gallops  ABDOMEN: Soft, Nontender, Nondistended;   SKIN: No rashes or lesions  NERVOUS SYSTEM:  Alert & Oriented X3, no focal deficit    LABS:                        15.3   11.47 )-----------( 206      ( 11 Aug 2020 09:12 )             43.6     08-10    141  |  103  |  10  ----------------------------<  112<H>  3.6   |  23  |  0.81  08-09    144  |  105  |  11  ----------------------------<  106<H>  3.9   |  27  |  0.86    Ca    9.7      10 Aug 2020 07:23  Ca    9.1      09 Aug 2020 23:33    TPro  6.8  /  Alb  4.5  /  TBili  0.7  /  DBili  x   /  AST  22  /  ALT  16  /  AlkPhos  66  08-10  TPro  7.0  /  Alb  4.7  /  TBili  0.6  /  DBili  x   /  AST  20  /  ALT  16  /  AlkPhos  66  08-09                                              14.6   9.92  )-----------( 221      ( 10 Aug 2020 07:23 )             41.7                         14.4   9.76  )-----------( 205      ( 09 Aug 2020 23:33 )             41.2     CAPILLARY BLOOD GLUCOSE          RADIOLOGY & ADDITIONAL TESTS:

## 2020-08-11 NOTE — DISCHARGE NOTE PROVIDER - NSDCFUADDAPPT_GEN_ALL_CORE_FT
Please follow up with your primary care doctor within a week of discharge.  Please follow up with the [facility] program tomorrow at 2pm for your telephone appointment.

## 2020-08-11 NOTE — MEDICAL STUDENT PROGRESS NOTE(EDUCATION) - NS MD HP STUD ASPLAN PLAN FT
Plan  1. Opiate withdrawal  - COWS score this morning ## - moderate withdrawal, continue to monitor  - this morning ##  - ECG - assess QTc given ondansetron use  - Psychiatry consult - recommends Suboxone 8mg/2mg TID starting 8/11. Zofran 4mg q8h PRN vomiting/nausea max 12mg in 24hrs, Imodium 2mg q12h PRN diarrhea, until  improvement of symptoms, clonidine 0.1mg -0.2mg q12h PRN (HOLD for SBP <100/60 ), Flexeril 10mg q12h PRN muscle spasms, Ibuprofen 400mg q6h PRN pain alternatively 800mg q8h PRN w/ food.  - call psych today for the Suboxone schedule for 8/12.  - consult for SBIRT is in, reffered to JOSE M [id number]  2. Prophylaxis and screening  - enoxaparin subq for DVT prophylaxis  - HIV testing  3. Nicotine dependence  - pt smokes 4-5 cigs/day, continue with daily nicotine patch Plan  1. Opiate withdrawal  - COWS score this morning 6 - minor withdrawal (chills movement, gi cramping) continue to monitor  - ECG - assess QTc given ondansetron use  - Psychiatry consult - recommends Suboxone 8mg/2mg TID starting 8/11. Zofran 4mg q8h PRN vomiting/nausea max 12mg in 24hrs, Imodium 2mg q12h PRN diarrhea, until  improvement of symptoms, clonidine 0.1mg -0.2mg q12h PRN (HOLD for SBP <100/60 ), Flexeril 10mg q12h PRN muscle spasms, Ibuprofen 400mg q6h PRN pain alternatively 800mg q8h PRN w/ food.  - call psych today for the Suboxone schedule for 8/12.  - consult for SBIRT is in, reffered to JOSE M [id number]  2. Prophylaxis and screening  - enoxaparin subq for DVT prophylaxis  - HIV testing  3. Nicotine dependence  - pt smokes 4-5 cigs/day, continue with daily nicotine patch Plan  1. Opiate withdrawal  - COWS score this morning 7 - minor withdrawal (chills movement, gi cramping, anxiety) continue to monitor  - ECG - assess QTc given ondansetron use  - Psychiatry consult - recommends Suboxone 8mg/2mg TID starting 8/11. Zofran 4mg q8h PRN vomiting/nausea max 12mg in 24hrs, Imodium 2mg q12h PRN diarrhea, until  improvement of symptoms, clonidine 0.1mg -0.2mg q12h PRN (HOLD for SBP <100/60 ), Flexeril 10mg q12h PRN muscle spasms, Ibuprofen 400mg q6h PRN pain alternatively 800mg q8h PRN w/ food.  - call psych today for the Suboxone schedule for 8/12.  - consult for SBIRT is in, reffered to JOSE M [id number]  2. Prophylaxis and screening  - enoxaparin subq for DVT prophylaxis  - HIV testing  3. Nicotine dependence  - pt smokes 4-5 cigs/day, continue with daily nicotine patch

## 2020-08-11 NOTE — PROGRESS NOTE BEHAVIORAL HEALTH - SUMMARY
34 yo male with h/o PE not on AC, opioid use dependence, no prior psychiatric history, no suicide attempts, no detox/rehab admission  admitted for management of withdrawal. Pt presents in acute withdrawal not adequately managed on Suboxone 2/0.5.     Impression   opioid use disorder, severe, currently withdrawal     Plan   can give Suboxone 8mg/2mg TID starting tomorrow, because pt has already received a total of 6mg today, can give 1 8/2 tab now, then in 12hours can give another dose, then start TID dosing starting 8/11    social work/ case management for follow up appointment care - pt will require a prescriber appt to ensure continuity and medication adherence otherwise he risks relapse     For breakthrough symptoms please refer to the following protocol, although not FDA approved, may alleviate symptoms associated with withdrawal symptoms:     Zofran 4mg q8h PRN vomiting/nausea max 12mg in 24hrs   Imodium 2mg q12h PRN diarrhea, until improvement of symptoms    clonidine 0.1mg -0.2mg q12h PRN  HOLD for SBP <100/60   Flexeril 10mg q12h PRN muscle spasms   Ibuprofen 400mg q6h PRN pain alternatively 800mg q8h PRN, with food if able to tolerate    PT may be discharged on Suboxone 8/2mg SL TID and Zofran as needed.    F/u at [facility]

## 2020-08-11 NOTE — DISCHARGE NOTE PROVIDER - CARE PROVIDER_API CALL
Rolan Fofana,   Phone: [phone]  Fax: (   )    -  Follow Up Time:     .Nicolás  [facility]  [id number] [place] [address]  Phone: [phone]  Fax: (   )    -  Follow Up Time:

## 2020-08-11 NOTE — DISCHARGE NOTE PROVIDER - NSDCFUADDINST_GEN_ALL_CORE_FT
Please avoid any opioid use while on Suboxone. Continue to follow up with the [facility] program. Call the [facility] program if you experience symptoms of withdrawal, such as nausea, diarrhea, and bone pain as your medication dosing may need to be adjusted.

## 2020-08-11 NOTE — PROGRESS NOTE BEHAVIORAL HEALTH - NSBHCHARTREVIEWINVESTIGATE_PSY_A_CORE FT
Ventricular Rate 50 BPM  Atrial Rate 50 BPM  P-R Interval 194 ms  QRS Duration 116 ms  Q-T Interval 420 ms    QTC Calculation(Bezet) 382 ms    P Axis 39 degrees    R Axis 88 degrees    T Axis 58 degrees    Diagnosis Line SINUS BRADYCARDIA WITH SINUS ARRHYTHMIA  INCOMPLETE RIGHT BUNDLE BRANCH BLOCK  BORDERLINE ECG  NO PREVIOUS ECGS AVAILABLE  Confirmed by SULLY PEREZ MD ([id number]) on 8/10/2020 9:14:21 PM

## 2020-08-11 NOTE — PROGRESS NOTE ADULT - PROBLEM SELECTOR PLAN 1
- COWS 18 on admission , s/p Suboxone 2mg/0.5mg SL tab x2, s/p 1L IV fluid  - COWS 14 this AM, in moderate withdrawal  - Consulted Psych for guidance with opiate withdrawal, appreciate recs  - Received Suboxone 8mg/2mg ~12pm. Will give one more at 10 pm  - Will give suboxone 8mg/2mg TID starting tomorrow  - Will put Zofran 4 mg q8 hrn for vomiting/nausea max 12 mg in 24 hrs  - Will have Immodium 2 mg q 12hr PRN diarrhea, until improvement of sxs  - Will have clonidine 0.1 mg q 12 hr PRN with parameter HOLD for SBP < 100/60  - Will have Flexeril 10 mg q 12hr PRN for muscle spasms  - Will have Ibuprofen 400 mg q6 hr PRN for pain  - Will f/u tox screen   - Will consult social work, put in SBIRT - COWS 18 on admission , s/p Suboxone 2mg/0.5mg SL tab x2, s/p 1L IV fluid  - COWS 6 this AM, in mild withdrawal  - Consulted Psych for guidance with opiate withdrawal, appreciate recs  - Will put Zofran 4 mg q8 hrn for vomiting/nausea max 12 mg in 24 hrs  - Will have Immodium 2 mg q 12hr PRN diarrhea, until improvement of sxs  - Will have clonidine 0.1 mg q 12 hr PRN with parameter HOLD for SBP < 100/60  - Will have Flexeril 10 mg q 12hr PRN for muscle spasms  - Will have Ibuprofen 400 mg q6 hr PRN for pain  - Will give suboxone 8mg/2mg TID today. Received morning dose ~30 min early. Will touch base with Psychiatry to ask for tomorrow's regimen  - Will f/u tox screen - COWS 18 on admission , s/p Suboxone 2mg/0.5mg SL tab x2, s/p 1L IV fluid  - COWS 6 this AM, in mild withdrawal  - Consulted Psych for guidance with opiate withdrawal, appreciate recs  - Will put Zofran 4 mg q8 hrn for vomiting/nausea max 12 mg in 24 hrs  - Will have Immodium 2 mg q 12hr PRN diarrhea, until improvement of sxs  - Will have clonidine 0.1 mg q 12 hr PRN with parameter HOLD for SBP < 100/60  - Will have Flexeril 10 mg q 12hr PRN for muscle spasms  - Will have Ibuprofen 400 mg q6 hr PRN for pain  - Will give suboxone 8mg/2mg TID today. Received morning dose ~30 min early. Will touch base with Psychiatry to ask for tomorrow's regimen and discharge status for suboxone prescription  - Will f/u tox screen

## 2020-08-11 NOTE — PROGRESS NOTE ADULT - PROBLEM SELECTOR PLAN 5
Transitions of Care Status:  1.  Name of PCP: Rolan Rodriguez   2.  PCP Contacted on Admission: [ ] Y    [x ] N    3.  PCP contacted at Discharge: [ ] Y    [ ] N    [ ] N/A  4.  Post-Discharge Appointment Date and Location:  5.  Summary of Handoff given to PCP: Transitions of Care Status:  1.  Name of PCP: Rolan Rodriguez   2.  PCP Contacted on Admission: [ ] Y    [x ] N    3.  PCP contacted at Discharge: [ ] Y    [ ] N    [ ] N/A  4.  Post-Discharge Appointment Date and Location:  5.  Summary of Handoff given to PCP:    - Consulted social work and SBIRT, appreciate recs  - SBIRT - Tom VILLALBA; Psychiatry assessed patient and induced Suboxone and recommends outpatient treatment for Suboxone maintenance

## 2020-08-11 NOTE — DISCHARGE NOTE PROVIDER - NSDCMRMEDTOKEN_GEN_ALL_CORE_FT
buprenorphine-naloxone 8 mg-2 mg sublingual film: 1 film(s) sublingual 3 times a day x 7 days MDD:3 films    [id number]  ondansetron 4 mg oral tablet, disintegratin tab(s) orally every 8 hours, As Needed -for nausea buprenorphine-naloxone 8 mg-2 mg sublingual film: 1 film(s) sublingual 3 times a day x 7 days MDD:3 films    [id number]  ondansetron 4 mg oral tablet, disintegratin tab(s) orally every 8 hours, As Needed -for nausea

## 2020-08-12 ENCOUNTER — OUTPATIENT (OUTPATIENT)
Dept: OUTPATIENT SERVICES | Facility: HOSPITAL | Age: 35
LOS: 1 days | Discharge: HOME | End: 2020-08-12

## 2020-08-12 PROBLEM — F11.10 OPIOID ABUSE, UNCOMPLICATED: Chronic | Status: ACTIVE | Noted: 2020-08-09

## 2020-08-12 PROBLEM — I26.99 OTHER PULMONARY EMBOLISM WITHOUT ACUTE COR PULMONALE: Chronic | Status: ACTIVE | Noted: 2020-08-09

## 2020-08-13 DIAGNOSIS — F10.20 ALCOHOL DEPENDENCE, UNCOMPLICATED: ICD-10-CM

## 2020-08-16 LAB
AMPHET UR-MCNC: NEGATIVE — SIGNIFICANT CHANGE UP
BARBITURATES, URINE.: NEGATIVE — SIGNIFICANT CHANGE UP
BENZODIAZ UR-MCNC: NEGATIVE — SIGNIFICANT CHANGE UP
COCAINE METAB.OTHER UR-MCNC: NEGATIVE — SIGNIFICANT CHANGE UP
CREATININE, URINE THERAPEUTIC: 191.5 MG/DL — SIGNIFICANT CHANGE UP
METHADONE UR-MCNC: NEGATIVE — SIGNIFICANT CHANGE UP
METHAQUALONE UR QL: NEGATIVE — SIGNIFICANT CHANGE UP
METHAQUALONE UR-MCNC: NEGATIVE — SIGNIFICANT CHANGE UP
OPIATES UR-MCNC: SIGNIFICANT CHANGE UP
PCP UR-MCNC: NEGATIVE — SIGNIFICANT CHANGE UP
PROPOXYPH UR QL: NEGATIVE — SIGNIFICANT CHANGE UP
THC UR QL: NEGATIVE — SIGNIFICANT CHANGE UP

## 2020-08-23 ENCOUNTER — TRANSCRIPTION ENCOUNTER (OUTPATIENT)
Age: 35
End: 2020-08-23

## 2020-10-07 ENCOUNTER — APPOINTMENT (OUTPATIENT)
Dept: ORTHOPEDIC SURGERY | Facility: CLINIC | Age: 35
End: 2020-10-07
Payer: COMMERCIAL

## 2020-10-07 VITALS
WEIGHT: 187 LBS | DIASTOLIC BLOOD PRESSURE: 82 MMHG | SYSTOLIC BLOOD PRESSURE: 161 MMHG | HEIGHT: 71 IN | HEART RATE: 74 BPM | BODY MASS INDEX: 26.18 KG/M2

## 2020-10-07 DIAGNOSIS — M54.16 RADICULOPATHY, LUMBAR REGION: ICD-10-CM

## 2020-10-07 PROCEDURE — 99203 OFFICE O/P NEW LOW 30 MIN: CPT

## 2020-10-07 RX ORDER — METHYLPREDNISOLONE 4 MG/1
4 TABLET ORAL
Qty: 1 | Refills: 0 | Status: ACTIVE | COMMUNITY
Start: 2020-10-07 | End: 1900-01-01

## 2020-10-07 NOTE — DISCUSSION/SUMMARY
[de-identified] : Given his worsening radicular complaints refractory to home exercises and medications an MRI will be obtained.  He will also try an oral steroid taper.  Follow-up afterwards.

## 2020-10-07 NOTE — PHYSICAL EXAM
[Antalgic] : antalgic [de-identified] : Examination of the lumbar spine reveals no midline tenderness palpation, step-offs, or skin lesions. Decreased range of motion with respect to flexion, extension, lateral bending, and rotation. No tenderness to palpation of the sciatic notch. No tenderness palpation of the bilateral greater trochanters. No pain with passive internal/external rotation of the hips. No instability of bilateral lower extremities.  Negative CAMERON.  Markedly positive right sided straight leg raise with bowstring. Negative femoral stretch. 5 out of 5 iliopsoas, hip abductors, hips adductors, quadriceps, hamstrings, gastrocsoleus, tibialis anterior, extensor hallucis longus, peroneals. Grossly intact sensation to light touch bilateral lower extremities. 1+ patellar and Achilles reflexes. Downgoing Babinski. No clonus. Intact proprioception. Palpable pulses. No skin lesion and no edema on the right and left lower extremities. [de-identified] : AP lateral lumbar x-rays without instability or aggressive lesions

## 2020-10-07 NOTE — HISTORY OF PRESENT ILLNESS
[de-identified] : Mr. GRACIELA RAGLAND  is a 35 year old male who presents with 3 weeks of right lumbar radiculopathy without any specific cause or trauma.  He has pain in his right buttock, hamstring, and back of his ankle.  He has had similar pain in the past but never this bad.   Normal bowel and bladder control.   Denies any recent fevers, chills, sweats, weight loss, or infection.\par \par The patients past medical history, past surgical history, medications, allergies, and social history were reviewed by me today with the patient and documented accordingly.  In addition, the patient's family history, which is noncontributory to their visit, was also reviewed.\par

## 2020-11-09 DIAGNOSIS — F11.20 OPIOID DEPENDENCE, UNCOMPLICATED: ICD-10-CM

## 2020-12-23 ENCOUNTER — TRANSCRIPTION ENCOUNTER (OUTPATIENT)
Age: 35
End: 2020-12-23

## 2024-06-24 ENCOUNTER — NON-APPOINTMENT (OUTPATIENT)
Age: 39
End: 2024-06-24

## 2024-07-16 ENCOUNTER — NON-APPOINTMENT (OUTPATIENT)
Age: 39
End: 2024-07-16

## 2024-07-16 ENCOUNTER — APPOINTMENT (OUTPATIENT)
Dept: ORTHOPEDIC SURGERY | Facility: CLINIC | Age: 39
End: 2024-07-16
Payer: COMMERCIAL

## 2024-07-16 VITALS
DIASTOLIC BLOOD PRESSURE: 68 MMHG | HEIGHT: 71 IN | SYSTOLIC BLOOD PRESSURE: 129 MMHG | WEIGHT: 187 LBS | HEART RATE: 97 BPM | BODY MASS INDEX: 26.18 KG/M2

## 2024-07-16 DIAGNOSIS — M25.551 PAIN IN RIGHT HIP: ICD-10-CM

## 2024-07-16 DIAGNOSIS — M54.16 RADICULOPATHY, LUMBAR REGION: ICD-10-CM

## 2024-07-16 DIAGNOSIS — M25.552 PAIN IN LEFT HIP: ICD-10-CM

## 2024-07-16 DIAGNOSIS — M25.561 PAIN IN RIGHT KNEE: ICD-10-CM

## 2024-07-16 PROCEDURE — 72190 X-RAY EXAM OF PELVIS: CPT

## 2024-07-16 PROCEDURE — 99204 OFFICE O/P NEW MOD 45 MIN: CPT

## 2024-07-16 PROCEDURE — 72100 X-RAY EXAM L-S SPINE 2/3 VWS: CPT

## 2024-07-16 RX ORDER — METHYLPREDNISOLONE 4 MG/1
4 TABLET ORAL
Qty: 1 | Refills: 0 | Status: ACTIVE | COMMUNITY
Start: 2024-07-16 | End: 1900-01-01

## 2025-01-26 NOTE — PROGRESS NOTE BEHAVIORAL HEALTH - NS ED [facility] MED ROS HEMATOLOGIC LYMPHATIC
50-year-old male presenting with superficial ulceration to left thigh.  No signs to suggest infection.  Plan to provide nonadherent dressing topical antibiotics for local wound care.
No complaints